# Patient Record
Sex: MALE | Race: WHITE | NOT HISPANIC OR LATINO | URBAN - METROPOLITAN AREA
[De-identification: names, ages, dates, MRNs, and addresses within clinical notes are randomized per-mention and may not be internally consistent; named-entity substitution may affect disease eponyms.]

---

## 2017-12-31 ENCOUNTER — INPATIENT (INPATIENT)
Facility: HOSPITAL | Age: 67
LOS: 0 days | Discharge: HOME | DRG: 694 | End: 2018-01-01
Attending: UROLOGY | Admitting: UROLOGY
Payer: COMMERCIAL

## 2017-12-31 VITALS
WEIGHT: 194.01 LBS | SYSTOLIC BLOOD PRESSURE: 195 MMHG | HEART RATE: 84 BPM | TEMPERATURE: 98 F | DIASTOLIC BLOOD PRESSURE: 94 MMHG | RESPIRATION RATE: 20 BRPM | OXYGEN SATURATION: 97 %

## 2017-12-31 DIAGNOSIS — N13.2 HYDRONEPHROSIS WITH RENAL AND URETERAL CALCULOUS OBSTRUCTION: ICD-10-CM

## 2017-12-31 DIAGNOSIS — Z98.890 OTHER SPECIFIED POSTPROCEDURAL STATES: Chronic | ICD-10-CM

## 2017-12-31 LAB — GLUCOSE BLDC GLUCOMTR-MCNC: 227 MG/DL — HIGH (ref 70–99)

## 2017-12-31 PROCEDURE — 93010 ELECTROCARDIOGRAM REPORT: CPT

## 2017-12-31 PROCEDURE — 99285 EMERGENCY DEPT VISIT HI MDM: CPT | Mod: 25

## 2017-12-31 PROCEDURE — 74176 CT ABD & PELVIS W/O CONTRAST: CPT | Mod: 26

## 2017-12-31 PROCEDURE — 71010: CPT | Mod: 26

## 2017-12-31 RX ORDER — MORPHINE SULFATE 50 MG/1
4 CAPSULE, EXTENDED RELEASE ORAL ONCE
Qty: 0 | Refills: 0 | Status: DISCONTINUED | OUTPATIENT
Start: 2017-12-31 | End: 2017-12-31

## 2017-12-31 RX ORDER — DEXTROSE 50 % IN WATER 50 %
25 SYRINGE (ML) INTRAVENOUS ONCE
Qty: 0 | Refills: 0 | Status: DISCONTINUED | OUTPATIENT
Start: 2017-12-31 | End: 2018-01-01

## 2017-12-31 RX ORDER — RIVAROXABAN 15 MG-20MG
20 KIT ORAL
Qty: 0 | Refills: 0 | Status: DISCONTINUED | OUTPATIENT
Start: 2018-01-01 | End: 2018-01-01

## 2017-12-31 RX ORDER — SODIUM CHLORIDE 9 MG/ML
1000 INJECTION INTRAMUSCULAR; INTRAVENOUS; SUBCUTANEOUS
Qty: 0 | Refills: 0 | Status: DISCONTINUED | OUTPATIENT
Start: 2017-12-31 | End: 2018-01-01

## 2017-12-31 RX ORDER — ACETAMINOPHEN 500 MG
650 TABLET ORAL EVERY 6 HOURS
Qty: 0 | Refills: 0 | Status: DISCONTINUED | OUTPATIENT
Start: 2017-12-31 | End: 2018-01-01

## 2017-12-31 RX ORDER — SODIUM CHLORIDE 9 MG/ML
1000 INJECTION INTRAMUSCULAR; INTRAVENOUS; SUBCUTANEOUS ONCE
Qty: 0 | Refills: 0 | Status: COMPLETED | OUTPATIENT
Start: 2017-12-31 | End: 2017-12-31

## 2017-12-31 RX ORDER — OXYCODONE AND ACETAMINOPHEN 5; 325 MG/1; MG/1
1 TABLET ORAL EVERY 4 HOURS
Qty: 0 | Refills: 0 | Status: DISCONTINUED | OUTPATIENT
Start: 2017-12-31 | End: 2018-01-01

## 2017-12-31 RX ORDER — SODIUM CHLORIDE 9 MG/ML
1000 INJECTION, SOLUTION INTRAVENOUS
Qty: 0 | Refills: 0 | Status: DISCONTINUED | OUTPATIENT
Start: 2017-12-31 | End: 2018-01-01

## 2017-12-31 RX ORDER — INSULIN LISPRO 100/ML
VIAL (ML) SUBCUTANEOUS
Qty: 0 | Refills: 0 | Status: DISCONTINUED | OUTPATIENT
Start: 2017-12-31 | End: 2018-01-01

## 2017-12-31 RX ORDER — DEXTROSE 50 % IN WATER 50 %
1 SYRINGE (ML) INTRAVENOUS ONCE
Qty: 0 | Refills: 0 | Status: DISCONTINUED | OUTPATIENT
Start: 2017-12-31 | End: 2018-01-01

## 2017-12-31 RX ORDER — GLUCAGON INJECTION, SOLUTION 0.5 MG/.1ML
1 INJECTION, SOLUTION SUBCUTANEOUS ONCE
Qty: 0 | Refills: 0 | Status: DISCONTINUED | OUTPATIENT
Start: 2017-12-31 | End: 2018-01-01

## 2017-12-31 RX ORDER — ONDANSETRON 8 MG/1
4 TABLET, FILM COATED ORAL ONCE
Qty: 0 | Refills: 0 | Status: COMPLETED | OUTPATIENT
Start: 2017-12-31 | End: 2017-12-31

## 2017-12-31 RX ORDER — HYDROMORPHONE HYDROCHLORIDE 2 MG/ML
0.5 INJECTION INTRAMUSCULAR; INTRAVENOUS; SUBCUTANEOUS EVERY 4 HOURS
Qty: 0 | Refills: 0 | Status: DISCONTINUED | OUTPATIENT
Start: 2017-12-31 | End: 2018-01-01

## 2017-12-31 RX ORDER — ONDANSETRON 8 MG/1
4 TABLET, FILM COATED ORAL EVERY 8 HOURS
Qty: 0 | Refills: 0 | Status: DISCONTINUED | OUTPATIENT
Start: 2017-12-31 | End: 2018-01-01

## 2017-12-31 RX ORDER — INSULIN GLARGINE 100 [IU]/ML
24 INJECTION, SOLUTION SUBCUTANEOUS AT BEDTIME
Qty: 0 | Refills: 0 | Status: DISCONTINUED | OUTPATIENT
Start: 2017-12-31 | End: 2018-01-01

## 2017-12-31 RX ORDER — OXYCODONE AND ACETAMINOPHEN 5; 325 MG/1; MG/1
2 TABLET ORAL EVERY 4 HOURS
Qty: 0 | Refills: 0 | Status: DISCONTINUED | OUTPATIENT
Start: 2017-12-31 | End: 2018-01-01

## 2017-12-31 RX ORDER — DEXTROSE 50 % IN WATER 50 %
12.5 SYRINGE (ML) INTRAVENOUS ONCE
Qty: 0 | Refills: 0 | Status: DISCONTINUED | OUTPATIENT
Start: 2017-12-31 | End: 2018-01-01

## 2017-12-31 RX ADMIN — MORPHINE SULFATE 4 MILLIGRAM(S): 50 CAPSULE, EXTENDED RELEASE ORAL at 17:56

## 2017-12-31 RX ADMIN — SODIUM CHLORIDE 1000 MILLILITER(S): 9 INJECTION INTRAMUSCULAR; INTRAVENOUS; SUBCUTANEOUS at 17:56

## 2017-12-31 RX ADMIN — MORPHINE SULFATE 4 MILLIGRAM(S): 50 CAPSULE, EXTENDED RELEASE ORAL at 20:30

## 2017-12-31 RX ADMIN — INSULIN GLARGINE 24 UNIT(S): 100 INJECTION, SOLUTION SUBCUTANEOUS at 23:11

## 2017-12-31 RX ADMIN — SODIUM CHLORIDE 1000 MILLILITER(S): 9 INJECTION INTRAMUSCULAR; INTRAVENOUS; SUBCUTANEOUS at 20:13

## 2017-12-31 RX ADMIN — ONDANSETRON 4 MILLIGRAM(S): 8 TABLET, FILM COATED ORAL at 17:56

## 2017-12-31 RX ADMIN — MORPHINE SULFATE 4 MILLIGRAM(S): 50 CAPSULE, EXTENDED RELEASE ORAL at 20:12

## 2017-12-31 RX ADMIN — MORPHINE SULFATE 4 MILLIGRAM(S): 50 CAPSULE, EXTENDED RELEASE ORAL at 18:45

## 2017-12-31 RX ADMIN — Medication 4: at 23:11

## 2017-12-31 RX ADMIN — MORPHINE SULFATE 4 MILLIGRAM(S): 50 CAPSULE, EXTENDED RELEASE ORAL at 20:14

## 2017-12-31 NOTE — ED PROVIDER NOTE - MEDICAL DECISION MAKING DETAILS
66 y/o m hx HTN, DM, DVT/PE on xarelto, kidney stones presents with sudden onset left flank/lower abd pain; pt vomited once in ED, normal creatinine, blood in urine.  Likely recurrent stone, will get CT, given morphine/zofran, IV hydration.  F/u CT and reassess.

## 2017-12-31 NOTE — ED PROVIDER NOTE - OBJECTIVE STATEMENT
66 y/o m hx HTN, DVT/PE on xarelto, DM, kidney stones presents c/o sudden onset left flank pain today with nausea.  Pt stating pain similar to previous episodes of kidney stones, although hasn't had in about 10 years.  Pt denies CP, SOB, dysuria, fever, chills, all other ROS negative.

## 2017-12-31 NOTE — H&P ADULT - NSHPPHYSICALEXAM_GEN_ALL_CORE
Gen: alert and awake  Abd: obese, soft, tender L mid abdomen and L flank  : +L CVAT, testes bilat descended, circumcised male, without masses  Ext: LLE>RLE, no calf tenderness bilaterally, +varicosites bilat ankles

## 2017-12-31 NOTE — H&P ADULT - HISTORY OF PRESENT ILLNESS
66 yo male with h/o kidney stones (s/p multiple lithotripsies), s/p R partial nephrectomy 2008, DM, htn, LBBB, tachycardia (s/p ablation 2012, and DVT LLE (on xarelto), presents to ED tonight c/o severe L flank pain with nausea since early afternoon today. No vomiting. No fever/chills. Voiding OK. No dysuria/hematuria.   CT a/p c/w 7mm L proximal ureteral stone with moderate hydronephrosis. 68 yo male with h/o kidney stones (s/p multiple lithotripsies), s/p R partial nephrectomy 2008, DM, htn, RBBB, tachycardia (s/p ablation 2012, and DVT LLE (on xarelto), presents to ED tonight c/o severe L flank pain with nausea since early afternoon today. No vomiting. No fever/chills. Voiding OK. No dysuria/hematuria.   CT a/p c/w 7mm L proximal ureteral stone with moderate hydronephrosis. 66 yo male with h/o kidney stones (s/p multiple lithotripsies), R renal Ca- s/p R partial nephrectomy 2008, DM, htn, RBBB, tachycardia (s/p ablation 2012), and DVT LLE (on xarelto), presents to ED tonight c/o severe L flank pain with nausea since early afternoon today. No vomiting. No fever/chills. Voiding OK. No dysuria/hematuria.   CT a/p c/w 7mm L proximal ureteral stone with moderate hydronephrosis.

## 2017-12-31 NOTE — ED PROVIDER NOTE - ATTENDING CONTRIBUTION TO CARE
Pt is a 66yo m, h/o htn, dm, dvt/pe on xarelto, kidney stones, who p/w c/o left flank pain radiating to left abd, with associated nausea, dry heaving. No dysuria, hematuria, freq, fever, chills. No cp, sob. Afebrile. HDS. WNWD m in mild painful distress. + s1, s2, rrr. Lungs cta b/l. Abd soft, mild left sided abd tenderness, no guarding/ rebound. + left cvat. Labs reviewed w/ findings of mild leukocytosis to 13, gluc to 200's, normal renal function, inr therapeutic range. UA + for blood, no infection. Pt arranged for non-contrast ct a/p to r/o kidney stone. Pt given morphine, zofran, ivf with improvement of sx's. Will continue to monitor.

## 2017-12-31 NOTE — ED ADULT TRIAGE NOTE - CHIEF COMPLAINT QUOTE
patient BIBA complains of left sided abdominal pain radiating to the left flank and testicle x 2 hours with nausea. patient reports history of kidney stones. denies fever chills.

## 2017-12-31 NOTE — H&P ADULT - NSHPLABSRESULTS_GEN_ALL_CORE
14.7   13.7  )-----------( 271      ( 31 Dec 2017 17:39 )             45.1       139  |  99  |  18  ----------------------------<  215<H>  4.3   |  21<L>  |  1.06    Ca    9.5      31 Dec 2017 17:38    TPro  7.5  /  Alb  4.0  /  TBili  0.3  /  DBili  x   /  AST  19  /  ALT  24  /  AlkPhos  120    PT/INR - ( 31 Dec 2017 17:39 )   PT: 27.3 sec;   INR: 2.42          PTT - ( 31 Dec 2017 17:39 )  PTT:56.3 sec  Urinalysis Basic - ( 31 Dec 2017 17:27 )    Color: Yellow / Appearance: Clear / S.025 / pH: x  Gluc: x / Ketone: Trace mg/dL  / Bili: Negative / Urobili: 0.2 E.U./dL   Blood: x / Protein: NEGATIVE mg/dL / Nitrite: NEGATIVE   Leuk Esterase: NEGATIVE / RBC: > 10 /HPF / WBC < 5 /HPF   Sq Epi: x / Non Sq Epi: 0-5 /HPF / Bacteria: Present /HPF    < from: CT Abdomen and Pelvis No Cont (17 @ 18:37) >      IMPRESSION:   Mildleft-sided hydronephrosis and hydroureter secondary to an   obstructing calculus in the proximal ureter measuring 7 mm. Additional   nonobstructing 5 mm left renal calculus. Mild perinephric stranding on   the left. No evidence of hydronephrosis or nephrolithiasis on the right.   Ancillary findings as above.         < end of copied text >

## 2017-12-31 NOTE — H&P ADULT - PROBLEM SELECTOR PLAN 1
-admit  -IVF's  -NPO P MN  -add on for OR 1/1/18 for cysto, L ureteral stent  -pain control  -continue xarelto  -cardiology for clearance  -insulin sliding scale  -f/u EKG and CXR

## 2017-12-31 NOTE — H&P ADULT - PMH
DM (diabetes mellitus)    DVT of lower extremity (deep venous thrombosis)  LLE OCT/2017  HTN (hypertension)    LBBB (left bundle branch block)    Renal cancer, right  s/p R partial nephrectomy 2008  Renal stones    Tachycardia, unspecified  s/p ablation 2012 DM (diabetes mellitus)    DVT of lower extremity (deep venous thrombosis)  LLE OCT/2017  HTN (hypertension)    RBBB    Renal cancer, right  s/p R partial nephrectomy 2008  Renal stones    Tachycardia, unspecified  s/p ablation 2012

## 2018-01-01 ENCOUNTER — TRANSCRIPTION ENCOUNTER (OUTPATIENT)
Age: 68
End: 2018-01-01

## 2018-01-01 VITALS
OXYGEN SATURATION: 99 % | RESPIRATION RATE: 17 BRPM | SYSTOLIC BLOOD PRESSURE: 131 MMHG | HEART RATE: 87 BPM | TEMPERATURE: 100 F | DIASTOLIC BLOOD PRESSURE: 80 MMHG

## 2018-01-01 LAB
ANION GAP SERPL CALC-SCNC: 12 MMOL/L — SIGNIFICANT CHANGE UP (ref 5–17)
APTT BLD: 33.5 SEC — SIGNIFICANT CHANGE UP (ref 27.5–37.4)
BLD GP AB SCN SERPL QL: NEGATIVE — SIGNIFICANT CHANGE UP
BUN SERPL-MCNC: 18 MG/DL — SIGNIFICANT CHANGE UP (ref 7–23)
CALCIUM SERPL-MCNC: 8.3 MG/DL — LOW (ref 8.4–10.5)
CHLORIDE SERPL-SCNC: 104 MMOL/L — SIGNIFICANT CHANGE UP (ref 96–108)
CO2 SERPL-SCNC: 23 MMOL/L — SIGNIFICANT CHANGE UP (ref 22–31)
CREAT SERPL-MCNC: 1.29 MG/DL — SIGNIFICANT CHANGE UP (ref 0.5–1.3)
GLUCOSE BLDC GLUCOMTR-MCNC: 151 MG/DL — HIGH (ref 70–99)
GLUCOSE BLDC GLUCOMTR-MCNC: 182 MG/DL — HIGH (ref 70–99)
GLUCOSE SERPL-MCNC: 220 MG/DL — HIGH (ref 70–99)
HCT VFR BLD CALC: 43.2 % — SIGNIFICANT CHANGE UP (ref 39–50)
HGB BLD-MCNC: 13.8 G/DL — SIGNIFICANT CHANGE UP (ref 13–17)
INR BLD: 1.42 — HIGH (ref 0.88–1.16)
MAGNESIUM SERPL-MCNC: 1.8 MG/DL — SIGNIFICANT CHANGE UP (ref 1.6–2.6)
MCHC RBC-ENTMCNC: 29.6 PG — SIGNIFICANT CHANGE UP (ref 27–34)
MCHC RBC-ENTMCNC: 31.9 G/DL — LOW (ref 32–36)
MCV RBC AUTO: 92.7 FL — SIGNIFICANT CHANGE UP (ref 80–100)
PHOSPHATE SERPL-MCNC: 3 MG/DL — SIGNIFICANT CHANGE UP (ref 2.5–4.5)
PLATELET # BLD AUTO: 271 K/UL — SIGNIFICANT CHANGE UP (ref 150–400)
POTASSIUM SERPL-MCNC: 4.8 MMOL/L — SIGNIFICANT CHANGE UP (ref 3.5–5.3)
POTASSIUM SERPL-SCNC: 4.8 MMOL/L — SIGNIFICANT CHANGE UP (ref 3.5–5.3)
PROTHROM AB SERPL-ACNC: 15.9 SEC — HIGH (ref 9.8–12.7)
RBC # BLD: 4.66 M/UL — SIGNIFICANT CHANGE UP (ref 4.2–5.8)
RBC # FLD: 14 % — SIGNIFICANT CHANGE UP (ref 10.3–16.9)
RH IG SCN BLD-IMP: POSITIVE — SIGNIFICANT CHANGE UP
SODIUM SERPL-SCNC: 139 MMOL/L — SIGNIFICANT CHANGE UP (ref 135–145)
WBC # BLD: 15.3 K/UL — HIGH (ref 3.8–10.5)
WBC # FLD AUTO: 15.3 K/UL — HIGH (ref 3.8–10.5)

## 2018-01-01 PROCEDURE — 96374 THER/PROPH/DIAG INJ IV PUSH: CPT

## 2018-01-01 PROCEDURE — 71045 X-RAY EXAM CHEST 1 VIEW: CPT

## 2018-01-01 PROCEDURE — 87086 URINE CULTURE/COLONY COUNT: CPT

## 2018-01-01 PROCEDURE — 36415 COLL VENOUS BLD VENIPUNCTURE: CPT

## 2018-01-01 PROCEDURE — 86901 BLOOD TYPING SEROLOGIC RH(D): CPT

## 2018-01-01 PROCEDURE — 85730 THROMBOPLASTIN TIME PARTIAL: CPT

## 2018-01-01 PROCEDURE — 96376 TX/PRO/DX INJ SAME DRUG ADON: CPT

## 2018-01-01 PROCEDURE — 86900 BLOOD TYPING SEROLOGIC ABO: CPT

## 2018-01-01 PROCEDURE — 83690 ASSAY OF LIPASE: CPT

## 2018-01-01 PROCEDURE — 85610 PROTHROMBIN TIME: CPT

## 2018-01-01 PROCEDURE — 81001 URINALYSIS AUTO W/SCOPE: CPT

## 2018-01-01 PROCEDURE — 74176 CT ABD & PELVIS W/O CONTRAST: CPT

## 2018-01-01 PROCEDURE — 80048 BASIC METABOLIC PNL TOTAL CA: CPT

## 2018-01-01 PROCEDURE — 83735 ASSAY OF MAGNESIUM: CPT

## 2018-01-01 PROCEDURE — 85027 COMPLETE CBC AUTOMATED: CPT

## 2018-01-01 PROCEDURE — 84100 ASSAY OF PHOSPHORUS: CPT

## 2018-01-01 PROCEDURE — 99285 EMERGENCY DEPT VISIT HI MDM: CPT | Mod: 25

## 2018-01-01 PROCEDURE — 80053 COMPREHEN METABOLIC PANEL: CPT

## 2018-01-01 PROCEDURE — 93005 ELECTROCARDIOGRAM TRACING: CPT

## 2018-01-01 PROCEDURE — 85025 COMPLETE CBC W/AUTO DIFF WBC: CPT

## 2018-01-01 PROCEDURE — 96375 TX/PRO/DX INJ NEW DRUG ADDON: CPT

## 2018-01-01 PROCEDURE — 86850 RBC ANTIBODY SCREEN: CPT

## 2018-01-01 PROCEDURE — 82962 GLUCOSE BLOOD TEST: CPT

## 2018-01-01 RX ORDER — TAMSULOSIN HYDROCHLORIDE 0.4 MG/1
1 CAPSULE ORAL
Qty: 21 | Refills: 0 | OUTPATIENT
Start: 2018-01-01 | End: 2018-01-21

## 2018-01-01 RX ORDER — DOCUSATE SODIUM 100 MG
1 CAPSULE ORAL
Qty: 12 | Refills: 0 | OUTPATIENT
Start: 2018-01-01 | End: 2018-01-06

## 2018-01-01 RX ORDER — TAMSULOSIN HYDROCHLORIDE 0.4 MG/1
0.4 CAPSULE ORAL AT BEDTIME
Qty: 0 | Refills: 0 | Status: DISCONTINUED | OUTPATIENT
Start: 2018-01-01 | End: 2018-01-01

## 2018-01-01 RX ADMIN — SODIUM CHLORIDE 100 MILLILITER(S): 9 INJECTION INTRAMUSCULAR; INTRAVENOUS; SUBCUTANEOUS at 07:58

## 2018-01-01 RX ADMIN — Medication 2: at 07:58

## 2018-01-01 RX ADMIN — OXYCODONE AND ACETAMINOPHEN 2 TABLET(S): 5; 325 TABLET ORAL at 14:30

## 2018-01-01 RX ADMIN — RIVAROXABAN 20 MILLIGRAM(S): KIT at 13:09

## 2018-01-01 RX ADMIN — OXYCODONE AND ACETAMINOPHEN 2 TABLET(S): 5; 325 TABLET ORAL at 13:10

## 2018-01-01 RX ADMIN — TAMSULOSIN HYDROCHLORIDE 0.4 MILLIGRAM(S): 0.4 CAPSULE ORAL at 07:58

## 2018-01-01 NOTE — DISCHARGE NOTE ADULT - HOSPITAL COURSE
67M hx DM, kidney stones, HTN, DVT LLE on xarelto, LBBB right renal cancer s/p R partial nephrectomy 2008 came to [facility] with a 7mm proximal left ureteral stone with moderate hydronephrosis. Patient managed conservatively with IVF and pain medication. Patient status improved. AVSS, afebrile and hemodynamically stable

## 2018-01-01 NOTE — DISCHARGE NOTE ADULT - PLAN OF CARE
ambulation, hydration regular diet, activity as tolerated. No heavy lifting. Stay well hydrated. If fever >100.4 or any change or worsening of symptoms please call doctor or report to ED. Make follow up appointment with Dr Eagle for stone management. Please also make a followup appointment with Dr Aragon (internist)

## 2018-01-01 NOTE — DISCHARGE NOTE ADULT - CARE PROVIDER_API CALL
Jomar Eagle), Urology  [address] [place] [address]  Phone: [phone]  Fax: [phone]    Petra Aragon), Critical Care Medicine; Internal Medicine  [address]  [place] [address]  Phone: [phone]  Fax: [phone]

## 2018-01-01 NOTE — PROGRESS NOTE ADULT - SUBJECTIVE AND OBJECTIVE BOX
INTERVAL HPI/OVERNIGHT EVENTS:  No acute events overnight.    VITALS:    T(F): 98.2 (18 @ 05:29), Max: 98.7 (17 @ 19:06)  HR: 76 (18 @ 05:29) (76 - 101)  BP: 148/85 (18 @ 05:29) (132/66 - 195/94)  RR: 17 (18 @ 05:29) (17 - 20)  SpO2: 97% (18 @ 05:29) (93% - 100%)  Wt(kg): --    I&O's Detail    31 Dec 2017 07:01  -  2018 05:46  --------------------------------------------------------  IN:    sodium chloride 0.9%.: 900 mL  Total IN: 900 mL    OUT:  Total OUT: 0 mL    Total NET: 900 mL          MEDICATIONS:    ANTIBIOTICS:      PAIN CONTROL:  acetaminophen   Tablet. 650 milliGRAM(s) Oral every 6 hours PRN  HYDROmorphone  Injectable 0.5 milliGRAM(s) IV Push every 4 hours PRN  ondansetron Injectable 4 milliGRAM(s) IV Push every 8 hours PRN  oxyCODONE    5 mG/acetaminophen 325 mG 1 Tablet(s) Oral every 4 hours PRN  oxyCODONE    5 mG/acetaminophen 325 mG 2 Tablet(s) Oral every 4 hours PRN       MEDS:      HEME/ONC  rivaroxaban 20 milliGRAM(s) Oral with breakfast        PHYSICAL EXAM:  General: No acute distress.  Alert and Oriented  Abdominal Exam:   Exam:      LABS:                        14.7   13.7  )-----------( 271      ( 31 Dec 2017 17:39 )             45.1     -    139  |  99  |  18  ----------------------------<  215<H>  4.3   |  21<L>  |  1.06    Ca    9.5      31 Dec 2017 17:38    TPro  7.5  /  Alb  4.0  /  TBili  0.3  /  DBili  x   /  AST  19  /  ALT  24  /  AlkPhos  120  12-31    PT/INR - ( 31 Dec 2017 17:39 )   PT: 27.3 sec;   INR: 2.42          PTT - ( 31 Dec 2017 17:39 )  PTT:56.3 sec  Urinalysis Basic - ( 31 Dec 2017 17:27 )    Color: Yellow / Appearance: Clear / S.025 / pH: x  Gluc: x / Ketone: Trace mg/dL  / Bili: Negative / Urobili: 0.2 E.U./dL   Blood: x / Protein: NEGATIVE mg/dL / Nitrite: NEGATIVE   Leuk Esterase: NEGATIVE / RBC: > 10 /HPF / WBC < 5 /HPF   Sq Epi: x / Non Sq Epi: 0-5 /HPF / Bacteria: Present /HPF        RADIOLOGY & ADDITIONAL TESTS: INTERVAL HPI/OVERNIGHT EVENTS:  Less discomfort L flank/back. No nausea/vomiting. Did not require pain medication after coming to his room.     VITALS:    T(F): 98.2 (18 @ 05:29), Max: 98.7 (17 @ 19:06)  HR: 76 (18 @ 05:29) (76 - 101)  BP: 148/85 (18 @ 05:29) (132/66 - 195/94)  RR: 17 (18 @ 05:29) (17 - 20)  SpO2: 97% (18 @ 05:29) (93% - 100%)  Wt(kg): --    I&O's Detail    31 Dec 2017 07:01  -  2018 05:46  --------------------------------------------------------  IN:    sodium chloride 0.9%.: 900 mL  Total IN: 900 mL    OUT:  Total OUT: 0 mL    Total NET: 900 mL          MEDICATIONS:    ANTIBIOTICS:      PAIN CONTROL:  acetaminophen   Tablet. 650 milliGRAM(s) Oral every 6 hours PRN  HYDROmorphone  Injectable 0.5 milliGRAM(s) IV Push every 4 hours PRN  ondansetron Injectable 4 milliGRAM(s) IV Push every 8 hours PRN  oxyCODONE    5 mG/acetaminophen 325 mG 1 Tablet(s) Oral every 4 hours PRN  oxyCODONE    5 mG/acetaminophen 325 mG 2 Tablet(s) Oral every 4 hours PRN       MEDS:      HEME/ONC  rivaroxaban 20 milliGRAM(s) Oral with breakfast        PHYSICAL EXAM:  General: No acute distress.  Alert and Oriented  Abdominal Exam: obese, soft, mild L flank discomfort   Exam: mild L CVAT      LABS:                        14.7   13.7  )-----------( 271      ( 31 Dec 2017 17:39 )             45.1         139  |  99  |  18  ----------------------------<  215<H>  4.3   |  21<L>  |  1.06    Ca    9.5      31 Dec 2017 17:38    TPro  7.5  /  Alb  4.0  /  TBili  0.3  /  DBili  x   /  AST  19  /  ALT  24  /  AlkPhos  120  12-31    PT/INR - ( 31 Dec 2017 17:39 )   PT: 27.3 sec;   INR: 2.42          PTT - ( 31 Dec 2017 17:39 )  PTT:56.3 sec  Urinalysis Basic - ( 31 Dec 2017 17:27 )    Color: Yellow / Appearance: Clear / S.025 / pH: x  Gluc: x / Ketone: Trace mg/dL  / Bili: Negative / Urobili: 0.2 E.U./dL   Blood: x / Protein: NEGATIVE mg/dL / Nitrite: NEGATIVE   Leuk Esterase: NEGATIVE / RBC: > 10 /HPF / WBC < 5 /HPF   Sq Epi: x / Non Sq Epi: 0-5 /HPF / Bacteria: Present /HPF        RADIOLOGY & ADDITIONAL TESTS:

## 2018-01-01 NOTE — PROGRESS NOTE ADULT - PROBLEM SELECTOR PLAN 1
-stable  -OOB  -f/u labs  -f/u cardiology clearance  -NPO for possible OR today -stable  -OOB  -f/u labs  -f/u cardiology clearance  -NPO for possible OR today  -continue present care

## 2018-01-01 NOTE — DISCHARGE NOTE ADULT - PATIENT PORTAL LINK FT
“You can access the Follow[employer] Patient Portal, offered by [facility], by registering with the following website: [email or web address]”

## 2018-01-01 NOTE — DISCHARGE NOTE ADULT - CARE PROVIDERS DIRECT ADDRESSES
[email or web address]

## 2018-01-01 NOTE — DISCHARGE NOTE ADULT - MEDICATION SUMMARY - MEDICATIONS TO TAKE
I will START or STAY ON the medications listed below when I get home from the hospital:    Percocet 5/325 oral tablet  -- 1 tab(s) by mouth every 6 hours, As Needed -for severe pain MDD:4   -- Caution federal law prohibits the transfer of this drug to any person other  than the person for whom it was prescribed.  May cause drowsiness.  Alcohol may intensify this effect.  Use care when operating dangerous machinery.  This prescription cannot be refilled.  This product contains acetaminophen.  Do not use  with any other product containing acetaminophen to prevent possible liver damage.  Using more of this medication than prescribed may cause serious breathing problems.    -- Indication: For For pain    Flomax 0.4 mg oral capsule  -- 1 cap(s) by mouth once a day   -- It is very important that you take or use this exactly as directed.  Do not skip doses or discontinue unless directed by your doctor.  May cause drowsiness.  Alcohol may intensify this effect.  Use care when operating dangerous machinery.  Some non-prescription drugs may aggravate your condition.  Read all labels carefully.  If a warning appears, check with your doctor before taking.  Swallow whole.  Do not crush.  Take with food or milk.    -- Indication: For Urine flow    Xarelto 20 mg oral tablet  -- 1 tab(s) by mouth once a day (in the morning)  -- Indication: For Home med    metFORMIN  -- Indication: For Home med    Levemir  -- 48 unit(s) subcutaneous once a day (at bedtime)  -- Indication: For Home med    metoprolol  -- orally once a day  -- Indication: For Home med    Colace 100 mg oral capsule  -- 1 cap(s) by mouth 2 times a day, As Needed -for constipation   -- Medication should be taken with plenty of water.    -- Indication: For For constipation

## 2018-01-02 ENCOUNTER — INPATIENT (INPATIENT)
Facility: HOSPITAL | Age: 68
LOS: 2 days | Discharge: HOME | DRG: 694 | End: 2018-01-05
Attending: UROLOGY | Admitting: UROLOGY
Payer: MEDICARE

## 2018-01-02 VITALS
TEMPERATURE: 99 F | HEART RATE: 102 BPM | OXYGEN SATURATION: 99 % | RESPIRATION RATE: 18 BRPM | DIASTOLIC BLOOD PRESSURE: 105 MMHG | SYSTOLIC BLOOD PRESSURE: 176 MMHG

## 2018-01-02 DIAGNOSIS — Z98.890 OTHER SPECIFIED POSTPROCEDURAL STATES: Chronic | ICD-10-CM

## 2018-01-02 DIAGNOSIS — N20.0 CALCULUS OF KIDNEY: ICD-10-CM

## 2018-01-02 LAB
ANION GAP SERPL CALC-SCNC: 16 MMOL/L — SIGNIFICANT CHANGE UP (ref 5–17)
APTT BLD: 36.4 SEC — SIGNIFICANT CHANGE UP (ref 27.5–37.4)
BASOPHILS NFR BLD AUTO: 0.2 % — SIGNIFICANT CHANGE UP (ref 0–2)
BUN SERPL-MCNC: 21 MG/DL — SIGNIFICANT CHANGE UP (ref 7–23)
CALCIUM SERPL-MCNC: 9.1 MG/DL — SIGNIFICANT CHANGE UP (ref 8.4–10.5)
CHLORIDE SERPL-SCNC: 100 MMOL/L — SIGNIFICANT CHANGE UP (ref 96–108)
CO2 SERPL-SCNC: 23 MMOL/L — SIGNIFICANT CHANGE UP (ref 22–31)
CREAT SERPL-MCNC: 1.83 MG/DL — HIGH (ref 0.5–1.3)
EOSINOPHIL NFR BLD AUTO: 0.2 % — SIGNIFICANT CHANGE UP (ref 0–6)
GLUCOSE SERPL-MCNC: 166 MG/DL — HIGH (ref 70–99)
HCT VFR BLD CALC: 46.1 % — SIGNIFICANT CHANGE UP (ref 39–50)
HGB BLD-MCNC: 14.6 G/DL — SIGNIFICANT CHANGE UP (ref 13–17)
INR BLD: 2.49 — HIGH (ref 0.88–1.16)
LYMPHOCYTES # BLD AUTO: 9.9 % — LOW (ref 13–44)
MCHC RBC-ENTMCNC: 29.5 PG — SIGNIFICANT CHANGE UP (ref 27–34)
MCHC RBC-ENTMCNC: 31.7 G/DL — LOW (ref 32–36)
MCV RBC AUTO: 93.1 FL — SIGNIFICANT CHANGE UP (ref 80–100)
MONOCYTES NFR BLD AUTO: 9.9 % — SIGNIFICANT CHANGE UP (ref 2–14)
NEUTROPHILS NFR BLD AUTO: 79.8 % — HIGH (ref 43–77)
PLATELET # BLD AUTO: 254 K/UL — SIGNIFICANT CHANGE UP (ref 150–400)
POTASSIUM SERPL-MCNC: 4.2 MMOL/L — SIGNIFICANT CHANGE UP (ref 3.5–5.3)
POTASSIUM SERPL-SCNC: 4.2 MMOL/L — SIGNIFICANT CHANGE UP (ref 3.5–5.3)
PROTHROM AB SERPL-ACNC: 28.2 SEC — HIGH (ref 9.8–12.7)
RBC # BLD: 4.95 M/UL — SIGNIFICANT CHANGE UP (ref 4.2–5.8)
RBC # FLD: 14.3 % — SIGNIFICANT CHANGE UP (ref 10.3–16.9)
SODIUM SERPL-SCNC: 139 MMOL/L — SIGNIFICANT CHANGE UP (ref 135–145)
WBC # BLD: 15.8 K/UL — HIGH (ref 3.8–10.5)
WBC # FLD AUTO: 15.8 K/UL — HIGH (ref 3.8–10.5)

## 2018-01-02 PROCEDURE — 99285 EMERGENCY DEPT VISIT HI MDM: CPT

## 2018-01-02 RX ORDER — ACETAMINOPHEN 500 MG
650 TABLET ORAL EVERY 6 HOURS
Qty: 0 | Refills: 0 | Status: DISCONTINUED | OUTPATIENT
Start: 2018-01-02 | End: 2018-01-03

## 2018-01-02 RX ORDER — SODIUM CHLORIDE 9 MG/ML
1000 INJECTION, SOLUTION INTRAVENOUS
Qty: 0 | Refills: 0 | Status: DISCONTINUED | OUTPATIENT
Start: 2018-01-02 | End: 2018-01-03

## 2018-01-02 RX ORDER — ONDANSETRON 8 MG/1
4 TABLET, FILM COATED ORAL ONCE
Qty: 0 | Refills: 0 | Status: COMPLETED | OUTPATIENT
Start: 2018-01-02 | End: 2018-01-02

## 2018-01-02 RX ORDER — CEFTRIAXONE 500 MG/1
1 INJECTION, POWDER, FOR SOLUTION INTRAMUSCULAR; INTRAVENOUS ONCE
Qty: 0 | Refills: 0 | Status: COMPLETED | OUTPATIENT
Start: 2018-01-02 | End: 2018-01-02

## 2018-01-02 RX ORDER — GLUCAGON INJECTION, SOLUTION 0.5 MG/.1ML
1 INJECTION, SOLUTION SUBCUTANEOUS ONCE
Qty: 0 | Refills: 0 | Status: DISCONTINUED | OUTPATIENT
Start: 2018-01-02 | End: 2018-01-03

## 2018-01-02 RX ORDER — SODIUM CHLORIDE 9 MG/ML
1000 INJECTION INTRAMUSCULAR; INTRAVENOUS; SUBCUTANEOUS ONCE
Qty: 0 | Refills: 0 | Status: COMPLETED | OUTPATIENT
Start: 2018-01-02 | End: 2018-01-02

## 2018-01-02 RX ORDER — DEXTROSE 50 % IN WATER 50 %
25 SYRINGE (ML) INTRAVENOUS ONCE
Qty: 0 | Refills: 0 | Status: DISCONTINUED | OUTPATIENT
Start: 2018-01-02 | End: 2018-01-03

## 2018-01-02 RX ORDER — TAMSULOSIN HYDROCHLORIDE 0.4 MG/1
0.4 CAPSULE ORAL DAILY
Qty: 0 | Refills: 0 | Status: DISCONTINUED | OUTPATIENT
Start: 2018-01-02 | End: 2018-01-03

## 2018-01-02 RX ORDER — ACETAMINOPHEN 500 MG
975 TABLET ORAL ONCE
Qty: 0 | Refills: 0 | Status: COMPLETED | OUTPATIENT
Start: 2018-01-02 | End: 2018-01-02

## 2018-01-02 RX ORDER — INSULIN LISPRO 100/ML
VIAL (ML) SUBCUTANEOUS
Qty: 0 | Refills: 0 | Status: DISCONTINUED | OUTPATIENT
Start: 2018-01-02 | End: 2018-01-03

## 2018-01-02 RX ORDER — DOCUSATE SODIUM 100 MG
100 CAPSULE ORAL
Qty: 0 | Refills: 0 | Status: DISCONTINUED | OUTPATIENT
Start: 2018-01-02 | End: 2018-01-03

## 2018-01-02 RX ORDER — HYDROMORPHONE HYDROCHLORIDE 2 MG/ML
0.5 INJECTION INTRAMUSCULAR; INTRAVENOUS; SUBCUTANEOUS
Qty: 0 | Refills: 0 | Status: DISCONTINUED | OUTPATIENT
Start: 2018-01-02 | End: 2018-01-03

## 2018-01-02 RX ORDER — SENNA PLUS 8.6 MG/1
2 TABLET ORAL AT BEDTIME
Qty: 0 | Refills: 0 | Status: DISCONTINUED | OUTPATIENT
Start: 2018-01-02 | End: 2018-01-03

## 2018-01-02 RX ORDER — CHLORPROMAZINE HCL 10 MG
25 TABLET ORAL ONCE
Qty: 0 | Refills: 0 | Status: DISCONTINUED | OUTPATIENT
Start: 2018-01-02 | End: 2018-01-02

## 2018-01-02 RX ORDER — MORPHINE SULFATE 50 MG/1
4 CAPSULE, EXTENDED RELEASE ORAL ONCE
Qty: 0 | Refills: 0 | Status: DISCONTINUED | OUTPATIENT
Start: 2018-01-02 | End: 2018-01-02

## 2018-01-02 RX ORDER — ONDANSETRON 8 MG/1
4 TABLET, FILM COATED ORAL EVERY 6 HOURS
Qty: 0 | Refills: 0 | Status: DISCONTINUED | OUTPATIENT
Start: 2018-01-02 | End: 2018-01-03

## 2018-01-02 RX ORDER — SODIUM CHLORIDE 9 MG/ML
1000 INJECTION INTRAMUSCULAR; INTRAVENOUS; SUBCUTANEOUS
Qty: 0 | Refills: 0 | Status: DISCONTINUED | OUTPATIENT
Start: 2018-01-02 | End: 2018-01-03

## 2018-01-02 RX ORDER — OXYCODONE AND ACETAMINOPHEN 5; 325 MG/1; MG/1
2 TABLET ORAL EVERY 6 HOURS
Qty: 0 | Refills: 0 | Status: DISCONTINUED | OUTPATIENT
Start: 2018-01-02 | End: 2018-01-03

## 2018-01-02 RX ORDER — DEXTROSE 50 % IN WATER 50 %
1 SYRINGE (ML) INTRAVENOUS ONCE
Qty: 0 | Refills: 0 | Status: DISCONTINUED | OUTPATIENT
Start: 2018-01-02 | End: 2018-01-03

## 2018-01-02 RX ORDER — BACLOFEN 100 %
10 POWDER (GRAM) MISCELLANEOUS ONCE
Qty: 0 | Refills: 0 | Status: COMPLETED | OUTPATIENT
Start: 2018-01-02 | End: 2018-01-02

## 2018-01-02 RX ORDER — OXYCODONE AND ACETAMINOPHEN 5; 325 MG/1; MG/1
1 TABLET ORAL EVERY 4 HOURS
Qty: 0 | Refills: 0 | Status: DISCONTINUED | OUTPATIENT
Start: 2018-01-02 | End: 2018-01-03

## 2018-01-02 RX ORDER — DEXTROSE 50 % IN WATER 50 %
12.5 SYRINGE (ML) INTRAVENOUS ONCE
Qty: 0 | Refills: 0 | Status: DISCONTINUED | OUTPATIENT
Start: 2018-01-02 | End: 2018-01-03

## 2018-01-02 RX ADMIN — Medication 10 MILLIGRAM(S): at 22:40

## 2018-01-02 RX ADMIN — CEFTRIAXONE 100 GRAM(S): 500 INJECTION, POWDER, FOR SOLUTION INTRAMUSCULAR; INTRAVENOUS at 22:26

## 2018-01-02 RX ADMIN — Medication 975 MILLIGRAM(S): at 22:15

## 2018-01-02 RX ADMIN — SODIUM CHLORIDE 2000 MILLILITER(S): 9 INJECTION INTRAMUSCULAR; INTRAVENOUS; SUBCUTANEOUS at 21:23

## 2018-01-02 RX ADMIN — MORPHINE SULFATE 4 MILLIGRAM(S): 50 CAPSULE, EXTENDED RELEASE ORAL at 21:22

## 2018-01-02 RX ADMIN — MORPHINE SULFATE 4 MILLIGRAM(S): 50 CAPSULE, EXTENDED RELEASE ORAL at 23:00

## 2018-01-02 RX ADMIN — ONDANSETRON 4 MILLIGRAM(S): 8 TABLET, FILM COATED ORAL at 23:00

## 2018-01-02 RX ADMIN — SODIUM CHLORIDE 2000 MILLILITER(S): 9 INJECTION INTRAMUSCULAR; INTRAVENOUS; SUBCUTANEOUS at 23:08

## 2018-01-02 RX ADMIN — Medication 975 MILLIGRAM(S): at 21:52

## 2018-01-02 NOTE — ED PROVIDER NOTE - ATTENDING CONTRIBUTION TO CARE
66 yo M with PMH of DM, DVT on Xarelto, kidney stones, R renal ca s/p partial nephrectomy p/w severe L flank pain since this evening. Pt was diagnosed with 7mm proximal L ureteral stone and admitted to urology on 12/31, managed conservatively with IVF and pain medications and discharged yesterday. Pt states he took oxycodone but the pain became more severe this evening. (+) nausea, fevers and chills. Pt AAO, NAD, RRR, CTA b/l, (+) left CVAT, Abd: soft/NT. Labs/ studies/CT noted. Pt given IVF and IV abx. Urology consulted and pt admitted. 68 yo M with PMH of DM, DVT on Xarelto, kidney stones, R renal ca s/p partial nephrectomy p/w severe L flank pain since this evening. Pt was diagnosed with 7mm proximal L ureteral stone and admitted to urology on 12/31, managed conservatively with IVF and pain medications and discharged yesterday. Pt states he took oxycodone but the pain became more severe this evening. (+) nausea, fevers and chills. Pt AAO, NAD, RRR, CTA b/l, (+) left CVAT, Abd: soft/NT. Labs/ studies noted. Pt given IVF and IV abx. Urology consulted and pt admitted.

## 2018-01-02 NOTE — ED ADULT NURSE NOTE - PMH
DM (diabetes mellitus)    DVT of lower extremity (deep venous thrombosis)  LLE OCT/2017  HTN (hypertension)    RBBB    Renal cancer, right  s/p R partial nephrectomy 2008  Renal stones    Tachycardia, unspecified  s/p ablation 2012

## 2018-01-02 NOTE — H&P ADULT - PROBLEM SELECTOR PLAN 1
-NPO  -IVF  -preop  -added on OR 1/3 cysto, L URS, left stent, poss litho  -DVT ppx (pt on Xerelto and took dose this morning, SCD to right LE only)  -bowel regimen  -ISS  -monitor for fever  -f/u PVR, monitor UO  -f/u UA  -f/u Ucxs, Bcxs

## 2018-01-02 NOTE — H&P ADULT - ATTENDING COMMENTS
seen and examined  +L CVA tenderness  subjective fevers  discussed with him and his daughter  for ureteral stent insertion  understands stent must be removed within 3 months

## 2018-01-02 NOTE — ED ADULT TRIAGE NOTE - CHIEF COMPLAINT QUOTE
I was diagnosed with kidney stones and am having severe pain.  The medications I was prescribed are not helping.

## 2018-01-02 NOTE — H&P ADULT - ASSESSMENT
67M with extensive PMH c/o increased left flank pain and subjective fever today with known 7mm left obstructing ureteral stone.

## 2018-01-02 NOTE — ED PROVIDER NOTE - OBJECTIVE STATEMENT
68 yo M with pmh of DM, DVT on xarelto, kidney stones, R renal ca s/p partial nephrectomy c/o severe L flank pain since this evening. Pt was diagnosed wit ha 7mm proximal L ureteral stone and admitted to urology on 12/31. Pt was managed conservatively with IVF and pain medications and discharged yesterday. Pt states he took oxycodone but the pain became more severe this evening. Associated with subjective fever, chills and nausea. Denies dysuria, hematuria.

## 2018-01-02 NOTE — ED PROVIDER NOTE - PHYSICAL EXAMINATION
CONSTITUTIONAL: Well-appearing; well-nourished; in no apparent distress.   HEAD: Normocephalic; atraumatic.   EYES: PERRL; EOM intact; conjunctiva and sclera clear  ENT: normal nose; no rhinorrhea; normal pharynx with no erythema or lesions.   NECK: Supple; non-tender; no LAD  CARDIOVASCULAR: Normal S1, S2; no murmurs, rubs, or gallops. Regular rate and rhythm.   RESPIRATORY: Breathing easily; breath sounds clear and equal bilaterally; no wheezes, rhonchi, or rales.  GI: Soft; non-distended; non-tender; no palpable organomegaly.   MSK: FROM at all extremities, normal tone   EXT: No cyanosis or edema; N/V intact  SKIN: Normal for age and race; warm; dry; good turgor; no apparent lesions or rash.   NEURO: A & O x 3; face symmetric; grossly unremarkable.   PSYCHOLOGICAL: The patient’s mood and manner are appropriate. CONSTITUTIONAL: ill appearing   HEAD: Normocephalic; atraumatic.   EYES: PERRL; EOM intact; conjunctiva and sclera clear  ENT: normal nose; no rhinorrhea; normal pharynx with no erythema or lesions.   NECK: Supple; non-tender; no LAD  CARDIOVASCULAR: Normal S1, S2; no murmurs, rubs, or gallops. Regular rate and rhythm.   RESPIRATORY: Breathing easily; breath sounds clear and equal bilaterally; no wheezes, rhonchi, or rales.  GI: Soft; non-distended; non-tender; no palpable organomegaly.   MSK: FROM at all extremities, normal tone   EXT: No cyanosis or edema; N/V intact  SKIN: warm to touch   NEURO: A & O x 3; face symmetric; grossly unremarkable.   PSYCHOLOGICAL: The patient’s mood and manner are appropriate.

## 2018-01-02 NOTE — ED PROVIDER NOTE - MEDICAL DECISION MAKING DETAILS
68 yo M with pmh of DM, DVT on xarelto, kidney stones, R renal ca s/p partial nephrectomy, 7mm proximal L ureteral stone c/o severe L flank pain since this evening. +subjective fever and chills. r/o infected stone. Pain control, labs, urology c/s.

## 2018-01-02 NOTE — H&P ADULT - HISTORY OF PRESENT ILLNESS
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 Pt monitored o/n and found to be stable/afebrile but poor surgical candidate so d/c'd with instructions to follow up as outpt for medical clearance and subsequent elective stent placement if could not pass stone on his own. Pt returns to ED today c/o left flank pain not relieved with pain meds, subjective fever and associated nausea. Pt's daughter at bedside reports pt did not drink much fluid or walk around much after d/c home. He denies shaking chills, objective fever, LOC, HA, CP, SOB, vomitus, d/c, hematuria, dysuria, LUTS.

## 2018-01-02 NOTE — H&P ADULT - PMH
DM (diabetes mellitus)    DVT of lower extremity (deep venous thrombosis)  LLE OCT/2017  HTN (hypertension)    RBBB    Renal cancer, right  s/p R partial nephrectomy 2008  Renal stones    Tachycardia, unspecified  s/p ablation 2012  Tremor of unknown origin

## 2018-01-03 DIAGNOSIS — Z79.01 LONG TERM (CURRENT) USE OF ANTICOAGULANTS: ICD-10-CM

## 2018-01-03 DIAGNOSIS — I82.432 ACUTE EMBOLISM AND THROMBOSIS OF LEFT POPLITEAL VEIN: ICD-10-CM

## 2018-01-03 DIAGNOSIS — I45.10 UNSPECIFIED RIGHT BUNDLE-BRANCH BLOCK: ICD-10-CM

## 2018-01-03 DIAGNOSIS — Z79.84 LONG TERM (CURRENT) USE OF ORAL HYPOGLYCEMIC DRUGS: ICD-10-CM

## 2018-01-03 DIAGNOSIS — N13.2 HYDRONEPHROSIS WITH RENAL AND URETERAL CALCULOUS OBSTRUCTION: ICD-10-CM

## 2018-01-03 DIAGNOSIS — I10 ESSENTIAL (PRIMARY) HYPERTENSION: ICD-10-CM

## 2018-01-03 DIAGNOSIS — E11.9 TYPE 2 DIABETES MELLITUS WITHOUT COMPLICATIONS: ICD-10-CM

## 2018-01-03 DIAGNOSIS — Z85.528 PERSONAL HISTORY OF OTHER MALIGNANT NEOPLASM OF KIDNEY: ICD-10-CM

## 2018-01-03 PROBLEM — N20.0 CALCULUS OF KIDNEY: Chronic | Status: ACTIVE | Noted: 2017-12-31

## 2018-01-03 LAB
ANION GAP SERPL CALC-SCNC: 13 MMOL/L — SIGNIFICANT CHANGE UP (ref 5–17)
ANION GAP SERPL CALC-SCNC: 15 MMOL/L — SIGNIFICANT CHANGE UP (ref 5–17)
APPEARANCE UR: (no result)
BASOPHILS NFR BLD AUTO: 0.1 % — SIGNIFICANT CHANGE UP (ref 0–2)
BILIRUB UR-MCNC: NEGATIVE — SIGNIFICANT CHANGE UP
BLD GP AB SCN SERPL QL: NEGATIVE — SIGNIFICANT CHANGE UP
BLD GP AB SCN SERPL QL: NEGATIVE — SIGNIFICANT CHANGE UP
BUN SERPL-MCNC: 18 MG/DL — SIGNIFICANT CHANGE UP (ref 7–23)
BUN SERPL-MCNC: 18 MG/DL — SIGNIFICANT CHANGE UP (ref 7–23)
CALCIUM SERPL-MCNC: 8 MG/DL — LOW (ref 8.4–10.5)
CALCIUM SERPL-MCNC: 8.1 MG/DL — LOW (ref 8.4–10.5)
CHLORIDE SERPL-SCNC: 103 MMOL/L — SIGNIFICANT CHANGE UP (ref 96–108)
CHLORIDE SERPL-SCNC: 105 MMOL/L — SIGNIFICANT CHANGE UP (ref 96–108)
CO2 SERPL-SCNC: 21 MMOL/L — LOW (ref 22–31)
CO2 SERPL-SCNC: 21 MMOL/L — LOW (ref 22–31)
COLOR SPEC: YELLOW — SIGNIFICANT CHANGE UP
CREAT SERPL-MCNC: 1.42 MG/DL — HIGH (ref 0.5–1.3)
CREAT SERPL-MCNC: 1.67 MG/DL — HIGH (ref 0.5–1.3)
DIFF PNL FLD: (no result)
EOSINOPHIL NFR BLD AUTO: 0.3 % — SIGNIFICANT CHANGE UP (ref 0–6)
GLUCOSE BLDC GLUCOMTR-MCNC: 122 MG/DL — HIGH (ref 70–99)
GLUCOSE BLDC GLUCOMTR-MCNC: 123 MG/DL — HIGH (ref 70–99)
GLUCOSE BLDC GLUCOMTR-MCNC: 182 MG/DL — HIGH (ref 70–99)
GLUCOSE BLDC GLUCOMTR-MCNC: 256 MG/DL — HIGH (ref 70–99)
GLUCOSE SERPL-MCNC: 132 MG/DL — HIGH (ref 70–99)
GLUCOSE SERPL-MCNC: 195 MG/DL — HIGH (ref 70–99)
GLUCOSE UR QL: NEGATIVE — SIGNIFICANT CHANGE UP
HBA1C BLD-MCNC: 7.6 % — HIGH (ref 4–5.6)
HCT VFR BLD CALC: 39.2 % — SIGNIFICANT CHANGE UP (ref 39–50)
HCT VFR BLD CALC: 39.5 % — SIGNIFICANT CHANGE UP (ref 39–50)
HGB BLD-MCNC: 12.4 G/DL — LOW (ref 13–17)
HGB BLD-MCNC: 12.5 G/DL — LOW (ref 13–17)
KETONES UR-MCNC: 15 MG/DL
LEUKOCYTE ESTERASE UR-ACNC: NEGATIVE — SIGNIFICANT CHANGE UP
LYMPHOCYTES # BLD AUTO: 12.7 % — LOW (ref 13–44)
MAGNESIUM SERPL-MCNC: 1.9 MG/DL — SIGNIFICANT CHANGE UP (ref 1.6–2.6)
MAGNESIUM SERPL-MCNC: 1.9 MG/DL — SIGNIFICANT CHANGE UP (ref 1.6–2.6)
MCHC RBC-ENTMCNC: 29.6 PG — SIGNIFICANT CHANGE UP (ref 27–34)
MCHC RBC-ENTMCNC: 29.9 PG — SIGNIFICANT CHANGE UP (ref 27–34)
MCHC RBC-ENTMCNC: 31.6 G/DL — LOW (ref 32–36)
MCHC RBC-ENTMCNC: 31.6 G/DL — LOW (ref 32–36)
MCV RBC AUTO: 93.6 FL — SIGNIFICANT CHANGE UP (ref 80–100)
MCV RBC AUTO: 94.5 FL — SIGNIFICANT CHANGE UP (ref 80–100)
MONOCYTES NFR BLD AUTO: 11 % — SIGNIFICANT CHANGE UP (ref 2–14)
NEUTROPHILS NFR BLD AUTO: 75.9 % — SIGNIFICANT CHANGE UP (ref 43–77)
NITRITE UR-MCNC: NEGATIVE — SIGNIFICANT CHANGE UP
PH UR: 5.5 — SIGNIFICANT CHANGE UP (ref 5–8)
PHOSPHATE SERPL-MCNC: 3.3 MG/DL — SIGNIFICANT CHANGE UP (ref 2.5–4.5)
PHOSPHATE SERPL-MCNC: 3.4 MG/DL — SIGNIFICANT CHANGE UP (ref 2.5–4.5)
PLATELET # BLD AUTO: 219 K/UL — SIGNIFICANT CHANGE UP (ref 150–400)
PLATELET # BLD AUTO: 223 K/UL — SIGNIFICANT CHANGE UP (ref 150–400)
POTASSIUM SERPL-MCNC: 4.2 MMOL/L — SIGNIFICANT CHANGE UP (ref 3.5–5.3)
POTASSIUM SERPL-MCNC: 4.7 MMOL/L — SIGNIFICANT CHANGE UP (ref 3.5–5.3)
POTASSIUM SERPL-SCNC: 4.2 MMOL/L — SIGNIFICANT CHANGE UP (ref 3.5–5.3)
POTASSIUM SERPL-SCNC: 4.7 MMOL/L — SIGNIFICANT CHANGE UP (ref 3.5–5.3)
PROT UR-MCNC: (no result) MG/DL
RBC # BLD: 4.15 M/UL — LOW (ref 4.2–5.8)
RBC # BLD: 4.22 M/UL — SIGNIFICANT CHANGE UP (ref 4.2–5.8)
RBC # FLD: 14 % — SIGNIFICANT CHANGE UP (ref 10.3–16.9)
RBC # FLD: 14.1 % — SIGNIFICANT CHANGE UP (ref 10.3–16.9)
RH IG SCN BLD-IMP: POSITIVE — SIGNIFICANT CHANGE UP
RH IG SCN BLD-IMP: POSITIVE — SIGNIFICANT CHANGE UP
SODIUM SERPL-SCNC: 137 MMOL/L — SIGNIFICANT CHANGE UP (ref 135–145)
SODIUM SERPL-SCNC: 141 MMOL/L — SIGNIFICANT CHANGE UP (ref 135–145)
SP GR SPEC: 1.02 — SIGNIFICANT CHANGE UP (ref 1–1.03)
UROBILINOGEN FLD QL: 0.2 E.U./DL — SIGNIFICANT CHANGE UP
WBC # BLD: 13.7 K/UL — HIGH (ref 3.8–10.5)
WBC # BLD: 15.1 K/UL — HIGH (ref 3.8–10.5)
WBC # FLD AUTO: 13.7 K/UL — HIGH (ref 3.8–10.5)
WBC # FLD AUTO: 15.1 K/UL — HIGH (ref 3.8–10.5)

## 2018-01-03 PROCEDURE — 74420 UROGRAPHY RTRGR +-KUB: CPT | Mod: 26

## 2018-01-03 PROCEDURE — 52332 CYSTOSCOPY AND TREATMENT: CPT | Mod: RT

## 2018-01-03 PROCEDURE — 93306 TTE W/DOPPLER COMPLETE: CPT | Mod: 26

## 2018-01-03 PROCEDURE — 99223 1ST HOSP IP/OBS HIGH 75: CPT | Mod: 57,25

## 2018-01-03 RX ORDER — ACETAMINOPHEN 500 MG
650 TABLET ORAL EVERY 6 HOURS
Qty: 0 | Refills: 0 | Status: DISCONTINUED | OUTPATIENT
Start: 2018-01-03 | End: 2018-01-05

## 2018-01-03 RX ORDER — HYDROMORPHONE HYDROCHLORIDE 2 MG/ML
0.5 INJECTION INTRAMUSCULAR; INTRAVENOUS; SUBCUTANEOUS ONCE
Qty: 0 | Refills: 0 | Status: DISCONTINUED | OUTPATIENT
Start: 2018-01-03 | End: 2018-01-03

## 2018-01-03 RX ORDER — RIVAROXABAN 15 MG-20MG
20 KIT ORAL EVERY 24 HOURS
Qty: 0 | Refills: 0 | Status: DISCONTINUED | OUTPATIENT
Start: 2018-01-03 | End: 2018-01-05

## 2018-01-03 RX ORDER — CEFTRIAXONE 500 MG/1
2 INJECTION, POWDER, FOR SOLUTION INTRAMUSCULAR; INTRAVENOUS EVERY 24 HOURS
Qty: 0 | Refills: 0 | Status: DISCONTINUED | OUTPATIENT
Start: 2018-01-03 | End: 2018-01-05

## 2018-01-03 RX ORDER — SODIUM CHLORIDE 9 MG/ML
1000 INJECTION, SOLUTION INTRAVENOUS
Qty: 0 | Refills: 0 | Status: DISCONTINUED | OUTPATIENT
Start: 2018-01-03 | End: 2018-01-05

## 2018-01-03 RX ORDER — ATROPA BELLADONNA AND OPIUM 16.2; 6 MG/1; MG/1
1 SUPPOSITORY RECTAL EVERY 8 HOURS
Qty: 0 | Refills: 0 | Status: DISCONTINUED | OUTPATIENT
Start: 2018-01-03 | End: 2018-01-05

## 2018-01-03 RX ORDER — SODIUM CHLORIDE 9 MG/ML
1000 INJECTION INTRAMUSCULAR; INTRAVENOUS; SUBCUTANEOUS
Qty: 0 | Refills: 0 | Status: DISCONTINUED | OUTPATIENT
Start: 2018-01-03 | End: 2018-01-03

## 2018-01-03 RX ORDER — HALOPERIDOL DECANOATE 100 MG/ML
2 INJECTION INTRAMUSCULAR ONCE
Qty: 0 | Refills: 0 | Status: COMPLETED | OUTPATIENT
Start: 2018-01-03 | End: 2018-01-03

## 2018-01-03 RX ORDER — DIPHENHYDRAMINE HCL 50 MG
25 CAPSULE ORAL AT BEDTIME
Qty: 0 | Refills: 0 | Status: DISCONTINUED | OUTPATIENT
Start: 2018-01-03 | End: 2018-01-04

## 2018-01-03 RX ORDER — DOCUSATE SODIUM 100 MG
100 CAPSULE ORAL THREE TIMES A DAY
Qty: 0 | Refills: 0 | Status: DISCONTINUED | OUTPATIENT
Start: 2018-01-03 | End: 2018-01-05

## 2018-01-03 RX ORDER — RIVAROXABAN 15 MG-20MG
20 KIT ORAL EVERY 24 HOURS
Qty: 0 | Refills: 0 | Status: DISCONTINUED | OUTPATIENT
Start: 2018-01-03 | End: 2018-01-03

## 2018-01-03 RX ORDER — SENNA PLUS 8.6 MG/1
2 TABLET ORAL AT BEDTIME
Qty: 0 | Refills: 0 | Status: DISCONTINUED | OUTPATIENT
Start: 2018-01-03 | End: 2018-01-05

## 2018-01-03 RX ORDER — INSULIN LISPRO 100/ML
VIAL (ML) SUBCUTANEOUS
Qty: 0 | Refills: 0 | Status: DISCONTINUED | OUTPATIENT
Start: 2018-01-03 | End: 2018-01-05

## 2018-01-03 RX ORDER — LABETALOL HCL 100 MG
10 TABLET ORAL ONCE
Qty: 0 | Refills: 0 | Status: DISCONTINUED | OUTPATIENT
Start: 2018-01-03 | End: 2018-01-03

## 2018-01-03 RX ORDER — OXYBUTYNIN CHLORIDE 5 MG
5 TABLET ORAL EVERY 8 HOURS
Qty: 0 | Refills: 0 | Status: DISCONTINUED | OUTPATIENT
Start: 2018-01-03 | End: 2018-01-05

## 2018-01-03 RX ORDER — LIDOCAINE 4 G/100G
1 CREAM TOPICAL EVERY 6 HOURS
Qty: 0 | Refills: 0 | Status: DISCONTINUED | OUTPATIENT
Start: 2018-01-03 | End: 2018-01-05

## 2018-01-03 RX ADMIN — ATROPA BELLADONNA AND OPIUM 1 SUPPOSITORY(S): 16.2; 6 SUPPOSITORY RECTAL at 13:22

## 2018-01-03 RX ADMIN — HYDROMORPHONE HYDROCHLORIDE 0.5 MILLIGRAM(S): 2 INJECTION INTRAMUSCULAR; INTRAVENOUS; SUBCUTANEOUS at 13:09

## 2018-01-03 RX ADMIN — Medication 4: at 22:46

## 2018-01-03 RX ADMIN — Medication 100 MILLIGRAM(S): at 22:46

## 2018-01-03 RX ADMIN — HYDROMORPHONE HYDROCHLORIDE 0.5 MILLIGRAM(S): 2 INJECTION INTRAMUSCULAR; INTRAVENOUS; SUBCUTANEOUS at 10:51

## 2018-01-03 RX ADMIN — SENNA PLUS 2 TABLET(S): 8.6 TABLET ORAL at 22:46

## 2018-01-03 RX ADMIN — RIVAROXABAN 20 MILLIGRAM(S): KIT at 17:05

## 2018-01-03 RX ADMIN — Medication 5 MILLIGRAM(S): at 10:57

## 2018-01-03 RX ADMIN — HYDROMORPHONE HYDROCHLORIDE 0.5 MILLIGRAM(S): 2 INJECTION INTRAMUSCULAR; INTRAVENOUS; SUBCUTANEOUS at 09:45

## 2018-01-03 RX ADMIN — HALOPERIDOL DECANOATE 2 MILLIGRAM(S): 100 INJECTION INTRAMUSCULAR at 09:40

## 2018-01-03 RX ADMIN — SODIUM CHLORIDE 150 MILLILITER(S): 9 INJECTION INTRAMUSCULAR; INTRAVENOUS; SUBCUTANEOUS at 00:38

## 2018-01-03 RX ADMIN — Medication: at 17:06

## 2018-01-03 RX ADMIN — Medication 25 MILLIGRAM(S): at 22:55

## 2018-01-03 RX ADMIN — OXYCODONE AND ACETAMINOPHEN 2 TABLET(S): 5; 325 TABLET ORAL at 01:02

## 2018-01-03 RX ADMIN — Medication 100 MILLIGRAM(S): at 13:08

## 2018-01-03 RX ADMIN — CEFTRIAXONE 100 GRAM(S): 500 INJECTION, POWDER, FOR SOLUTION INTRAMUSCULAR; INTRAVENOUS at 22:47

## 2018-01-03 RX ADMIN — OXYCODONE AND ACETAMINOPHEN 2 TABLET(S): 5; 325 TABLET ORAL at 00:23

## 2018-01-03 RX ADMIN — LIDOCAINE 1 APPLICATION(S): 4 CREAM TOPICAL at 10:27

## 2018-01-03 RX ADMIN — HYDROMORPHONE HYDROCHLORIDE 0.5 MILLIGRAM(S): 2 INJECTION INTRAMUSCULAR; INTRAVENOUS; SUBCUTANEOUS at 13:08

## 2018-01-03 RX ADMIN — LIDOCAINE 1 APPLICATION(S): 4 CREAM TOPICAL at 13:22

## 2018-01-03 NOTE — PROGRESS NOTE ADULT - SUBJECTIVE AND OBJECTIVE BOX
PRE OP NOTE    Urology Preop Note     Diagnosis: obstructing ureteral stone  Procedure: cysto, L URS, L stent, possible lithotripsy  Surgeon: Dr. Eagle     S: 67M with extensive PMH returned to ED with left flank pain and subjective fever 2/2 7mm obstructing stone  O:   T(C): 38.1 (18 @ 21:43), Max: 38.1 (18 @ 21:43)  HR: 105 (18 @ 23:38) (102 - 107)  BP: 173/79 (18 @ 23:38) (173/79 - 176/105)  RR: 18 (18 @ 23:38) (18 - 18)  SpO2: 96% (18 @ 23:38) (96% - 99%)  Wt(kg): --        Ucx: collected    EK/31 EKG reviewed, new EKG ordered     CXR:  CXR reviewed, new CXR ordered    67yMale admitted for flank pain d/t ureteral stone, going to OR for cysto, L URS, L stent and possible litho  - NPO   - IV fluids  - consent obtained

## 2018-01-03 NOTE — BRIEF OPERATIVE NOTE - PROCEDURE
<<-----Click on this checkbox to enter Procedure Cystoscopy with insertion of ureteral stent  01/03/2018  left  Active  JSARCONA

## 2018-01-03 NOTE — PROGRESS NOTE ADULT - SUBJECTIVE AND OBJECTIVE BOX
Postop:  Pt denies chest pain, SOB, NV or severe abdominal pain       Vital Signs Last 24 Hrs  T(C): 36.8 (03 Jan 2018 09:22), Max: 38.1 (02 Jan 2018 21:43)  T(F): 98.2 (03 Jan 2018 09:22), Max: 100.5 (02 Jan 2018 21:43)  HR: 74 (03 Jan 2018 11:23) (74 - 120)  BP: 160/75 (03 Jan 2018 11:23) (122/64 - 181/116)  BP(mean): 106 (03 Jan 2018 10:34) (97 - 147)  RR: 16 (03 Jan 2018 11:23) (8 - 34)  SpO2: 98% (03 Jan 2018 11:23) (95% - 99%)    I&O's Summary    02 Jan 2018 07:01  -  03 Jan 2018 07:00  --------------------------------------------------------  IN: 1200 mL / OUT: 820 mL / NET: 380 mL    03 Jan 2018 07:01  -  03 Jan 2018 11:40  --------------------------------------------------------  IN: 0 mL / OUT: 250 mL / NET: -250 mL        Gen: NAD    Abd: abdomen moderately distended, NTTP    : stallings draining clear urine                          12.5   15.1  )-----------( 223      ( 03 Jan 2018 06:31 )             39.5     01-03    137  |  103  |  18  ----------------------------<  132<H>  4.2   |  21<L>  |  1.67<H>    Ca    8.0<L>      03 Jan 2018 06:30  Phos  3.3     01-03  Mg     1.9     01-03      culturesCulture Results:   Less than 10,000 cols/cc; Insignificant amount of growth. (12-31 @ 18:26)      A/P: 67M hx 7mm l ureteral stone, temp 100.5 s/p cysto, L stent placement 1/3  1- stallings- monitor uop  2- regular diet  3- OOB amb, IS  4- Cards: ECHO, will see as an outpatient  5- monitor distended abdomen  6- fu Ucx, Bcx

## 2018-01-03 NOTE — PROGRESS NOTE ADULT - SUBJECTIVE AND OBJECTIVE BOX
AM NOTE    Patient is a 67y old  Male who presents with a chief complaint of left flank pain with subjective fever (02 Jan 2018 22:44)    No acute events o/n pt remained afebrile      Vital Signs Last 24 Hrs  T(C): 37.2 (03 Jan 2018 00:00), Max: 38.1 (02 Jan 2018 21:43)  T(F): 99 (03 Jan 2018 00:00), Max: 100.5 (02 Jan 2018 21:43)  HR: 105 (02 Jan 2018 23:38) (102 - 107)  BP: 173/79 (02 Jan 2018 23:38) (173/79 - 176/105)  BP(mean): --  RR: 18 (02 Jan 2018 23:38) (18 - 18)  SpO2: 96% (02 Jan 2018 23:38) (96% - 99%)    I&O's Summary    02 Jan 2018 07:01  -  03 Jan 2018 04:10  --------------------------------------------------------  IN: 750 mL / OUT: 400 mL / NET: 350 mL        Gen: NAD    Abd: +obese, softly distended, nttp, no guarding  mild left CVA ttp, right nttp  : +BRP                          14.6   15.8  )-----------( 254      ( 02 Jan 2018 21:20 )             46.1     01-02    139  |  100  |  21  ----------------------------<  166<H>  4.2   |  23  |  1.83<H>    Ca    9.1      02 Jan 2018 21:20  Phos  3.0     01-01  Mg     1.8     01-01      culturesCulture Results:   Less than 10,000 cols/cc; Insignificant amount of growth. (12-31 @ 18:26)      A/P  67M with left obstructing stone returned to ED with pain and subjected fever. Pt pre-op complete, consented and added on for ureteral stent today.  -NPO, IVF  -monitor for fever  -pain meds PRN  -monitor UO  -f/u approval of steven Murphy dose   -dvt ppx  -bowel regimen  -OOB/IS  -ISS

## 2018-01-04 ENCOUNTER — TRANSCRIPTION ENCOUNTER (OUTPATIENT)
Age: 68
End: 2018-01-04

## 2018-01-04 DIAGNOSIS — N20.1 CALCULUS OF URETER: ICD-10-CM

## 2018-01-04 LAB
ANION GAP SERPL CALC-SCNC: 11 MMOL/L — SIGNIFICANT CHANGE UP (ref 5–17)
BASOPHILS NFR BLD AUTO: 0.1 % — SIGNIFICANT CHANGE UP (ref 0–2)
BUN SERPL-MCNC: 21 MG/DL — SIGNIFICANT CHANGE UP (ref 7–23)
CALCIUM SERPL-MCNC: 8.7 MG/DL — SIGNIFICANT CHANGE UP (ref 8.4–10.5)
CHLORIDE SERPL-SCNC: 103 MMOL/L — SIGNIFICANT CHANGE UP (ref 96–108)
CO2 SERPL-SCNC: 22 MMOL/L — SIGNIFICANT CHANGE UP (ref 22–31)
CREAT SERPL-MCNC: 1.41 MG/DL — HIGH (ref 0.5–1.3)
CULTURE RESULTS: NO GROWTH — SIGNIFICANT CHANGE UP
CULTURE RESULTS: NO GROWTH — SIGNIFICANT CHANGE UP
EOSINOPHIL NFR BLD AUTO: 0.2 % — SIGNIFICANT CHANGE UP (ref 0–6)
GLUCOSE BLDC GLUCOMTR-MCNC: 119 MG/DL — HIGH (ref 70–99)
GLUCOSE BLDC GLUCOMTR-MCNC: 146 MG/DL — HIGH (ref 70–99)
GLUCOSE BLDC GLUCOMTR-MCNC: 176 MG/DL — HIGH (ref 70–99)
GLUCOSE BLDC GLUCOMTR-MCNC: 182 MG/DL — HIGH (ref 70–99)
GLUCOSE SERPL-MCNC: 217 MG/DL — HIGH (ref 70–99)
HCT VFR BLD CALC: 39.2 % — SIGNIFICANT CHANGE UP (ref 39–50)
HGB BLD-MCNC: 12.8 G/DL — LOW (ref 13–17)
LYMPHOCYTES # BLD AUTO: 12.8 % — LOW (ref 13–44)
MAGNESIUM SERPL-MCNC: 2.2 MG/DL — SIGNIFICANT CHANGE UP (ref 1.6–2.6)
MCHC RBC-ENTMCNC: 30.7 PG — SIGNIFICANT CHANGE UP (ref 27–34)
MCHC RBC-ENTMCNC: 32.7 G/DL — SIGNIFICANT CHANGE UP (ref 32–36)
MCV RBC AUTO: 94 FL — SIGNIFICANT CHANGE UP (ref 80–100)
MONOCYTES NFR BLD AUTO: 11.4 % — SIGNIFICANT CHANGE UP (ref 2–14)
NEUTROPHILS NFR BLD AUTO: 75.5 % — SIGNIFICANT CHANGE UP (ref 43–77)
PHOSPHATE SERPL-MCNC: 3 MG/DL — SIGNIFICANT CHANGE UP (ref 2.5–4.5)
PLATELET # BLD AUTO: 239 K/UL — SIGNIFICANT CHANGE UP (ref 150–400)
POTASSIUM SERPL-MCNC: 4.2 MMOL/L — SIGNIFICANT CHANGE UP (ref 3.5–5.3)
POTASSIUM SERPL-SCNC: 4.2 MMOL/L — SIGNIFICANT CHANGE UP (ref 3.5–5.3)
RBC # BLD: 4.17 M/UL — LOW (ref 4.2–5.8)
RBC # FLD: 14.1 % — SIGNIFICANT CHANGE UP (ref 10.3–16.9)
SODIUM SERPL-SCNC: 136 MMOL/L — SIGNIFICANT CHANGE UP (ref 135–145)
SPECIMEN SOURCE: SIGNIFICANT CHANGE UP
SPECIMEN SOURCE: SIGNIFICANT CHANGE UP
WBC # BLD: 12.8 K/UL — HIGH (ref 3.8–10.5)
WBC # FLD AUTO: 12.8 K/UL — HIGH (ref 3.8–10.5)

## 2018-01-04 RX ORDER — OXYCODONE AND ACETAMINOPHEN 5; 325 MG/1; MG/1
1 TABLET ORAL EVERY 6 HOURS
Qty: 0 | Refills: 0 | Status: DISCONTINUED | OUTPATIENT
Start: 2018-01-04 | End: 2018-01-05

## 2018-01-04 RX ORDER — METOPROLOL TARTRATE 50 MG
25 TABLET ORAL
Qty: 0 | Refills: 0 | Status: DISCONTINUED | OUTPATIENT
Start: 2018-01-04 | End: 2018-01-05

## 2018-01-04 RX ORDER — ZALEPLON 10 MG
5 CAPSULE ORAL AT BEDTIME
Qty: 0 | Refills: 0 | Status: DISCONTINUED | OUTPATIENT
Start: 2018-01-04 | End: 2018-01-05

## 2018-01-04 RX ORDER — OXYCODONE AND ACETAMINOPHEN 5; 325 MG/1; MG/1
2 TABLET ORAL EVERY 6 HOURS
Qty: 0 | Refills: 0 | Status: DISCONTINUED | OUTPATIENT
Start: 2018-01-04 | End: 2018-01-05

## 2018-01-04 RX ADMIN — Medication 100 MILLIGRAM(S): at 13:16

## 2018-01-04 RX ADMIN — Medication 25 MILLIGRAM(S): at 13:05

## 2018-01-04 RX ADMIN — Medication 100 MILLIGRAM(S): at 22:16

## 2018-01-04 RX ADMIN — OXYCODONE AND ACETAMINOPHEN 2 TABLET(S): 5; 325 TABLET ORAL at 13:59

## 2018-01-04 RX ADMIN — Medication 2: at 07:51

## 2018-01-04 RX ADMIN — CEFTRIAXONE 100 GRAM(S): 500 INJECTION, POWDER, FOR SOLUTION INTRAMUSCULAR; INTRAVENOUS at 22:15

## 2018-01-04 RX ADMIN — OXYCODONE AND ACETAMINOPHEN 2 TABLET(S): 5; 325 TABLET ORAL at 13:05

## 2018-01-04 RX ADMIN — Medication 100 MILLIGRAM(S): at 07:54

## 2018-01-04 RX ADMIN — OXYCODONE AND ACETAMINOPHEN 2 TABLET(S): 5; 325 TABLET ORAL at 20:53

## 2018-01-04 RX ADMIN — OXYCODONE AND ACETAMINOPHEN 2 TABLET(S): 5; 325 TABLET ORAL at 21:53

## 2018-01-04 RX ADMIN — Medication 2: at 12:10

## 2018-01-04 RX ADMIN — RIVAROXABAN 20 MILLIGRAM(S): KIT at 17:51

## 2018-01-04 NOTE — DISCHARGE NOTE ADULT - HOSPITAL COURSE
67M with 7mm left ureteral stone presented with uncontrollable pain and subjective fever 1/2. He underwent cystoscopy, left URS and left stent placement 1/3 without complication. Pt is hemodynamically stable with AVSS, ambulating, tolerating PO and with pain controlled. He has passed a TOV and ready for d/c home.

## 2018-01-04 NOTE — DISCHARGE NOTE ADULT - MEDICATION SUMMARY - MEDICATIONS TO TAKE
I will START or STAY ON the medications listed below when I get home from the hospital:    Percocet 5/325 oral tablet  -- 1 tab(s) by mouth every 6 hours, As Needed -for severe pain MDD:4   -- Caution federal law prohibits the transfer of this drug to any person other  than the person for whom it was prescribed.  May cause drowsiness.  Alcohol may intensify this effect.  Use care when operating dangerous machinery.  This prescription cannot be refilled.  This product contains acetaminophen.  Do not use  with any other product containing acetaminophen to prevent possible liver damage.  Using more of this medication than prescribed may cause serious breathing problems.    -- Indication: For for pain    Flomax 0.4 mg oral capsule  -- 1 cap(s) by mouth once a day   -- It is very important that you take or use this exactly as directed.  Do not skip doses or discontinue unless directed by your doctor.  May cause drowsiness.  Alcohol may intensify this effect.  Use care when operating dangerous machinery.  Some non-prescription drugs may aggravate your condition.  Read all labels carefully.  If a warning appears, check with your doctor before taking.  Swallow whole.  Do not crush.  Take with food or milk.    -- Indication: For Urine flow    Xarelto 20 mg oral tablet  -- 1 tab(s) by mouth once a day (in the morning)  -- Indication: For home med    metFORMIN  -- Indication: For home med    Levemir  -- 48 unit(s) subcutaneous once a day (at bedtime)  -- Indication: For home med    metoprolol  -- orally once a day  -- Indication: For home med    Colace 100 mg oral capsule  -- 1 cap(s) by mouth 2 times a day, As Needed -for constipation   -- Medication should be taken with plenty of water.    -- Indication: For for constipation    Bactrim  mg-160 mg oral tablet  -- 1 tab(s) by mouth 2 times a day   -- Avoid prolonged or excessive exposure to direct and/or artificial sunlight while taking this medication.  Finish all this medication unless otherwise directed by prescriber.  Medication should be taken with plenty of water.    -- Indication: For infection prophylaxis    oxybutynin 5 mg oral tablet  -- 1 tab(s) by mouth every 8 hours, As needed, Bladder spasms  -- Indication: For for stent pain/bladder spasms

## 2018-01-04 NOTE — DISCHARGE NOTE ADULT - CARE PLAN
Principal Discharge DX:	Ureteral stone  Goal:	hydration, ambulation, pain and infection control  Instructions for follow-up, activity and diet:	You have a ureteral stent in place on the left side that will need to be removed by your urologist, please follow up as discussed. You also had an ECHO completed while in the hospital and should follow up with cardiologist Dr. Silverman as an outpatient. Stay well hydrated and active. If you have fever > 100.4, uncontrollable pain or vomiting or any other acute concerns, go to ER or call MD.

## 2018-01-04 NOTE — DIETITIAN INITIAL EVALUATION ADULT. - OTHER INFO
66yo M s/p cysto, L ureteral stent POD #1; h/o fever. Pt currently on CSTCHO w/ evening snack and tolerating PO. Endorses good appetite, stating he consumed >75% breakfast this am. Denies GI distress except constipation, last BM was Sunday night. Noted pt is ordered for bowel regimen. Reviewed purpose of current diet order. Pt had several misconceptions about conceptions about concentrated sugar/starchy foods. Discussed adjustments to usual meals (i.e, portion sizes of rice, optimal types of chips). Encouraged pt to focus on protein sources and making vegetables the base of dishes. Written edu was provided on CHO counting. NKFA or dietary restricitons. Documented wt is 206lb. States last wt was 189lbs. Please obtain new standing wt for accuracy. Skin: WDL, GI: distension per flowsheet.

## 2018-01-04 NOTE — DISCHARGE NOTE ADULT - PATIENT PORTAL LINK FT
“You can access the Follow[facility] Patient Portal, offered by [facility], by registering with the following website: [email or web address]”

## 2018-01-04 NOTE — PROGRESS NOTE ADULT - PROBLEM SELECTOR PLAN 1
-stable  -OOB  -f/u labs  -f/u cultures  -continue antibx -stable  -OOB  -f/u labs  -f/u cultures  -continue antibx  -TOV , f/u PVR

## 2018-01-04 NOTE — PROGRESS NOTE ADULT - SUBJECTIVE AND OBJECTIVE BOX
INTERVAL HPI/OVERNIGHT EVENTS:  No acute events overnight.    VITALS:    T(F): 99.5 (18 @ 22:32), Max: 99.5 (18 @ 22:32)  HR: 72 (18 @ 00:56) (72 - 120)  BP: 151/69 (18 @ 00:56) (122/64 - 181/116)  RR: 11 (18 @ 00:56) (8 - 34)  SpO2: 96% (18 @ 00:56) (95% - 98%)  Wt(kg): --    I&O's Detail    2018 07:01  -  2018 07:00  --------------------------------------------------------  IN:    sodium chloride 0.9%: 1200 mL  Total IN: 1200 mL    OUT:    Voided: 820 mL  Total OUT: 820 mL    Total NET: 380 mL      2018 07:01  -  2018 04:10  --------------------------------------------------------  IN:    IV PiggyBack: 50 mL  Total IN: 50 mL    OUT:    Indwelling Catheter - Urethral: 1650 mL  Total OUT: 1650 mL    Total NET: -1600 mL          MEDICATIONS:    ANTIBIOTICS:  cefTRIAXone   IVPB 2 Gram(s) IV Intermittent every 24 hours      PAIN CONTROL:  acetaminophen   Tablet 650 milliGRAM(s) Oral every 6 hours PRN  acetaminophen   Tablet. 650 milliGRAM(s) Oral every 6 hours PRN  belladonna 16.2 mG/opium 60 mg Suppository 1 Suppository(s) Rectal every 8 hours PRN  diphenhydrAMINE   Capsule 25 milliGRAM(s) Oral at bedtime PRN       MEDS:  oxybutynin 5 milliGRAM(s) Oral every 8 hours PRN      HEME/ONC  rivaroxaban 20 milliGRAM(s) Oral every 24 hours        PHYSICAL EXAM:  General: No acute distress.  Alert and Oriented  Abdominal Exam: soft, obese, NT, ND   Exam: FC intact, urine clear      LABS:                        12.4   13.7  )-----------( 219      ( 2018 15:46 )             39.2         141  |  105  |  18  ----------------------------<  195<H>  4.7   |  21<L>  |  1.42<H>    Ca    8.1<L>      2018 15:46  Phos  3.4       Mg     1.9           PT/INR - ( 2018 22:43 )   PT: 28.2 sec;   INR: 2.49          PTT - ( 2018 22:43 )  PTT:36.4 sec  Urinalysis Basic - ( 2018 00:01 )    Color: Yellow / Appearance: SL Cloudy / S.020 / pH: x  Gluc: x / Ketone: 15 mg/dL  / Bili: Negative / Urobili: 0.2 E.U./dL   Blood: x / Protein: Trace mg/dL / Nitrite: NEGATIVE   Leuk Esterase: NEGATIVE / RBC: Many /HPF / WBC 5-10 /HPF   Sq Epi: x / Non Sq Epi: 0-5 /HPF / Bacteria: Present /HPF        RADIOLOGY & ADDITIONAL TESTS:

## 2018-01-04 NOTE — DISCHARGE NOTE ADULT - CARE PROVIDERS DIRECT ADDRESSES
[email or web address]

## 2018-01-04 NOTE — DIETITIAN INITIAL EVALUATION ADULT. - ADHERENCE
poor/per dietary recall pt does not adhere to CSTCHO diet outside of [facility].

## 2018-01-04 NOTE — DISCHARGE NOTE ADULT - PLAN OF CARE
hydration, ambulation, pain and infection control You have a ureteral stent in place on the left side that will need to be removed by your urologist, please follow up as discussed. You also had an ECHO completed while in the hospital and should follow up with cardiologist Dr. Silverman as an outpatient. Stay well hydrated and active. If you have fever > 100.4, uncontrollable pain or vomiting or any other acute concerns, go to ER or call MD.

## 2018-01-04 NOTE — DISCHARGE NOTE ADULT - CARE PROVIDER_API CALL
Jomar Eagle), Urology  [address] [place] [address]  Phone: [phone]  Fax: [phone]    More Silverman), Internal Medicine  [address] [id number]  Phone: [phone]  Fax: [phone]

## 2018-01-04 NOTE — DIETITIAN INITIAL EVALUATION ADULT. - ENERGY NEEDS
Height: 5'8" Weight: 206lbs, IBW 154lbs+/-10%, %%, BMI 31.3  IBW used for calculations as pt >120% of IBW   Nutrient needs based on [facility] standards of care for maintenance in older adults.

## 2018-01-04 NOTE — DIETITIAN INITIAL EVALUATION ADULT. - NS AS NUTRI INTERV ED CONTENT
Purpose of the nutrition education/diabetes medical nutrition therapy, reviewed portion sizes, CHO counting, altering current dietary habits

## 2018-01-05 VITALS — TEMPERATURE: 98 F

## 2018-01-05 LAB
ANION GAP SERPL CALC-SCNC: 15 MMOL/L — SIGNIFICANT CHANGE UP (ref 5–17)
BASOPHILS NFR BLD AUTO: 0.4 % — SIGNIFICANT CHANGE UP (ref 0–2)
BUN SERPL-MCNC: 22 MG/DL — SIGNIFICANT CHANGE UP (ref 7–23)
CALCIUM SERPL-MCNC: 8.5 MG/DL — SIGNIFICANT CHANGE UP (ref 8.4–10.5)
CHLORIDE SERPL-SCNC: 101 MMOL/L — SIGNIFICANT CHANGE UP (ref 96–108)
CO2 SERPL-SCNC: 22 MMOL/L — SIGNIFICANT CHANGE UP (ref 22–31)
CREAT SERPL-MCNC: 1.35 MG/DL — HIGH (ref 0.5–1.3)
EOSINOPHIL NFR BLD AUTO: 1.2 % — SIGNIFICANT CHANGE UP (ref 0–6)
GLUCOSE BLDC GLUCOMTR-MCNC: 151 MG/DL — HIGH (ref 70–99)
GLUCOSE BLDC GLUCOMTR-MCNC: 171 MG/DL — HIGH (ref 70–99)
GLUCOSE BLDC GLUCOMTR-MCNC: 179 MG/DL — HIGH (ref 70–99)
GLUCOSE SERPL-MCNC: 165 MG/DL — HIGH (ref 70–99)
HCT VFR BLD CALC: 41.8 % — SIGNIFICANT CHANGE UP (ref 39–50)
HGB BLD-MCNC: 13.1 G/DL — SIGNIFICANT CHANGE UP (ref 13–17)
LYMPHOCYTES # BLD AUTO: 24.5 % — SIGNIFICANT CHANGE UP (ref 13–44)
MAGNESIUM SERPL-MCNC: 2.1 MG/DL — SIGNIFICANT CHANGE UP (ref 1.6–2.6)
MCHC RBC-ENTMCNC: 29.2 PG — SIGNIFICANT CHANGE UP (ref 27–34)
MCHC RBC-ENTMCNC: 31.3 G/DL — LOW (ref 32–36)
MCV RBC AUTO: 93.3 FL — SIGNIFICANT CHANGE UP (ref 80–100)
MONOCYTES NFR BLD AUTO: 10.3 % — SIGNIFICANT CHANGE UP (ref 2–14)
NEUTROPHILS NFR BLD AUTO: 63.6 % — SIGNIFICANT CHANGE UP (ref 43–77)
PHOSPHATE SERPL-MCNC: 3.3 MG/DL — SIGNIFICANT CHANGE UP (ref 2.5–4.5)
PLATELET # BLD AUTO: 303 K/UL — SIGNIFICANT CHANGE UP (ref 150–400)
POTASSIUM SERPL-MCNC: 3.8 MMOL/L — SIGNIFICANT CHANGE UP (ref 3.5–5.3)
POTASSIUM SERPL-SCNC: 3.8 MMOL/L — SIGNIFICANT CHANGE UP (ref 3.5–5.3)
RBC # BLD: 4.48 M/UL — SIGNIFICANT CHANGE UP (ref 4.2–5.8)
RBC # FLD: 13.9 % — SIGNIFICANT CHANGE UP (ref 10.3–16.9)
SODIUM SERPL-SCNC: 138 MMOL/L — SIGNIFICANT CHANGE UP (ref 135–145)
WBC # BLD: 11.4 K/UL — HIGH (ref 3.8–10.5)
WBC # FLD AUTO: 11.4 K/UL — HIGH (ref 3.8–10.5)

## 2018-01-05 PROCEDURE — 85027 COMPLETE CBC AUTOMATED: CPT

## 2018-01-05 PROCEDURE — 83036 HEMOGLOBIN GLYCOSYLATED A1C: CPT

## 2018-01-05 PROCEDURE — C1758: CPT

## 2018-01-05 PROCEDURE — 80048 BASIC METABOLIC PNL TOTAL CA: CPT

## 2018-01-05 PROCEDURE — 86900 BLOOD TYPING SEROLOGIC ABO: CPT

## 2018-01-05 PROCEDURE — 84100 ASSAY OF PHOSPHORUS: CPT

## 2018-01-05 PROCEDURE — 36415 COLL VENOUS BLD VENIPUNCTURE: CPT

## 2018-01-05 PROCEDURE — 85025 COMPLETE CBC W/AUTO DIFF WBC: CPT

## 2018-01-05 PROCEDURE — 86850 RBC ANTIBODY SCREEN: CPT

## 2018-01-05 PROCEDURE — C2617: CPT

## 2018-01-05 PROCEDURE — 99285 EMERGENCY DEPT VISIT HI MDM: CPT | Mod: 25

## 2018-01-05 PROCEDURE — 83735 ASSAY OF MAGNESIUM: CPT

## 2018-01-05 PROCEDURE — 76000 FLUOROSCOPY <1 HR PHYS/QHP: CPT

## 2018-01-05 PROCEDURE — 86901 BLOOD TYPING SEROLOGIC RH(D): CPT

## 2018-01-05 PROCEDURE — C8929: CPT

## 2018-01-05 PROCEDURE — 85730 THROMBOPLASTIN TIME PARTIAL: CPT

## 2018-01-05 PROCEDURE — C1769: CPT

## 2018-01-05 PROCEDURE — 81001 URINALYSIS AUTO W/SCOPE: CPT

## 2018-01-05 PROCEDURE — 85610 PROTHROMBIN TIME: CPT

## 2018-01-05 PROCEDURE — 87086 URINE CULTURE/COLONY COUNT: CPT

## 2018-01-05 PROCEDURE — 87040 BLOOD CULTURE FOR BACTERIA: CPT

## 2018-01-05 PROCEDURE — 96374 THER/PROPH/DIAG INJ IV PUSH: CPT

## 2018-01-05 PROCEDURE — 82962 GLUCOSE BLOOD TEST: CPT

## 2018-01-05 RX ORDER — OXYBUTYNIN CHLORIDE 5 MG
1 TABLET ORAL
Qty: 15 | Refills: 0 | OUTPATIENT
Start: 2018-01-05 | End: 2018-01-09

## 2018-01-05 RX ORDER — AZTREONAM 2 G
1 VIAL (EA) INJECTION
Qty: 14 | Refills: 0 | OUTPATIENT
Start: 2018-01-05 | End: 2018-01-11

## 2018-01-05 RX ADMIN — Medication 25 MILLIGRAM(S): at 12:47

## 2018-01-05 RX ADMIN — RIVAROXABAN 20 MILLIGRAM(S): KIT at 14:08

## 2018-01-05 RX ADMIN — Medication 2: at 07:56

## 2018-01-05 RX ADMIN — Medication 2: at 12:47

## 2018-01-05 RX ADMIN — Medication 25 MILLIGRAM(S): at 01:52

## 2018-01-05 RX ADMIN — OXYCODONE AND ACETAMINOPHEN 2 TABLET(S): 5; 325 TABLET ORAL at 14:06

## 2018-01-05 RX ADMIN — Medication 100 MILLIGRAM(S): at 13:51

## 2018-01-05 RX ADMIN — Medication 100 MILLIGRAM(S): at 06:18

## 2018-01-05 NOTE — PROGRESS NOTE ADULT - SUBJECTIVE AND OBJECTIVE BOX
INTERVAL HPI/OVERNIGHT EVENTS:  No acute events overnight.    VITALS:    T(F): 98.2 (01-05-18 @ 05:21), Max: 99.7 (01-04-18 @ 10:00)  HR: 64 (01-05-18 @ 04:50) (64 - 94)  BP: 154/73 (01-05-18 @ 04:50) (128/66 - 183/77)  RR: 14 (01-05-18 @ 04:50) (14 - 16)  SpO2: 97% (01-05-18 @ 04:50) (96% - 99%)  Wt(kg): --    I&O's Detail    03 Jan 2018 07:01  -  04 Jan 2018 07:00  --------------------------------------------------------  IN:    IV PiggyBack: 50 mL  Total IN: 50 mL    OUT:    Indwelling Catheter - Urethral: 2100 mL  Total OUT: 2100 mL    Total NET: -2050 mL      04 Jan 2018 07:01  -  05 Jan 2018 06:36  --------------------------------------------------------  IN:  Total IN: 0 mL    OUT:    Voided: 700 mL  Total OUT: 700 mL    Total NET: -700 mL          MEDICATIONS:    ANTIBIOTICS:  cefTRIAXone   IVPB 2 Gram(s) IV Intermittent every 24 hours      PAIN CONTROL:  acetaminophen   Tablet 650 milliGRAM(s) Oral every 6 hours PRN  acetaminophen   Tablet. 650 milliGRAM(s) Oral every 6 hours PRN  belladonna 16.2 mG/opium 60 mg Suppository 1 Suppository(s) Rectal every 8 hours PRN  oxyCODONE    5 mG/acetaminophen 325 mG 1 Tablet(s) Oral every 6 hours PRN  oxyCODONE    5 mG/acetaminophen 325 mG 2 Tablet(s) Oral every 6 hours PRN  zaleplon 5 milliGRAM(s) Oral at bedtime PRN       MEDS:  oxybutynin 5 milliGRAM(s) Oral every 8 hours PRN      HEME/ONC  rivaroxaban 20 milliGRAM(s) Oral every 24 hours        PHYSICAL EXAM:  General: No acute distress.  Alert and Oriented  Abdominal Exam: soft, NT, ND   Exam: BRP      LABS:                        13.1   11.4  )-----------( 303      ( 05 Jan 2018 06:04 )             41.8     01-05    138  |  101  |  22  ----------------------------<  165<H>  3.8   |  22  |  1.35<H>    Ca    8.5      05 Jan 2018 06:04  Phos  3.3     01-05  Mg     2.1     01-05            RADIOLOGY & ADDITIONAL TESTS:

## 2018-01-08 PROBLEM — Z00.00 ENCOUNTER FOR PREVENTIVE HEALTH EXAMINATION: Status: ACTIVE | Noted: 2018-01-08

## 2018-01-08 LAB
CULTURE RESULTS: SIGNIFICANT CHANGE UP
CULTURE RESULTS: SIGNIFICANT CHANGE UP
SPECIMEN SOURCE: SIGNIFICANT CHANGE UP
SPECIMEN SOURCE: SIGNIFICANT CHANGE UP

## 2018-01-17 ENCOUNTER — APPOINTMENT (OUTPATIENT)
Dept: UROLOGY | Facility: CLINIC | Age: 68
End: 2018-01-17
Payer: MEDICARE

## 2018-01-17 ENCOUNTER — TRANSCRIPTION ENCOUNTER (OUTPATIENT)
Age: 68
End: 2018-01-17

## 2018-01-17 VITALS
HEIGHT: 67 IN | SYSTOLIC BLOOD PRESSURE: 152 MMHG | HEART RATE: 87 BPM | BODY MASS INDEX: 29.66 KG/M2 | WEIGHT: 189 LBS | OXYGEN SATURATION: 98 % | TEMPERATURE: 98.3 F | DIASTOLIC BLOOD PRESSURE: 84 MMHG

## 2018-01-17 DIAGNOSIS — I10 ESSENTIAL (PRIMARY) HYPERTENSION: ICD-10-CM

## 2018-01-17 DIAGNOSIS — Z86.718 PERSONAL HISTORY OF OTHER VENOUS THROMBOSIS AND EMBOLISM: ICD-10-CM

## 2018-01-17 DIAGNOSIS — I45.10 UNSPECIFIED RIGHT BUNDLE-BRANCH BLOCK: ICD-10-CM

## 2018-01-17 DIAGNOSIS — Z88.0 ALLERGY STATUS TO PENICILLIN: ICD-10-CM

## 2018-01-17 DIAGNOSIS — E11.9 TYPE 2 DIABETES MELLITUS WITHOUT COMPLICATIONS: ICD-10-CM

## 2018-01-17 DIAGNOSIS — Z85.528 PERSONAL HISTORY OF OTHER MALIGNANT NEOPLASM OF KIDNEY: ICD-10-CM

## 2018-01-17 DIAGNOSIS — N17.9 ACUTE KIDNEY FAILURE, UNSPECIFIED: ICD-10-CM

## 2018-01-17 DIAGNOSIS — R50.9 FEVER, UNSPECIFIED: ICD-10-CM

## 2018-01-17 DIAGNOSIS — N13.2 HYDRONEPHROSIS WITH RENAL AND URETERAL CALCULOUS OBSTRUCTION: ICD-10-CM

## 2018-01-17 PROCEDURE — 99214 OFFICE O/P EST MOD 30 MIN: CPT | Mod: 57

## 2018-01-18 ENCOUNTER — APPOINTMENT (OUTPATIENT)
Dept: HEART AND VASCULAR | Facility: CLINIC | Age: 68
End: 2018-01-18
Payer: MEDICARE

## 2018-01-18 VITALS
HEIGHT: 67 IN | HEART RATE: 79 BPM | OXYGEN SATURATION: 99 % | DIASTOLIC BLOOD PRESSURE: 76 MMHG | SYSTOLIC BLOOD PRESSURE: 122 MMHG | WEIGHT: 189 LBS | BODY MASS INDEX: 29.66 KG/M2 | TEMPERATURE: 97.8 F

## 2018-01-18 DIAGNOSIS — R94.31 ABNORMAL ELECTROCARDIOGRAM [ECG] [EKG]: ICD-10-CM

## 2018-01-18 DIAGNOSIS — Z01.818 ENCOUNTER FOR OTHER PREPROCEDURAL EXAMINATION: ICD-10-CM

## 2018-01-18 PROCEDURE — 93000 ELECTROCARDIOGRAM COMPLETE: CPT

## 2018-01-18 PROCEDURE — 93306 TTE W/DOPPLER COMPLETE: CPT | Mod: XE

## 2018-01-18 PROCEDURE — 99204 OFFICE O/P NEW MOD 45 MIN: CPT | Mod: 25

## 2018-01-22 LAB
ALBUMIN SERPL ELPH-MCNC: 4.4 G/DL
ALP BLD-CCNC: 130 U/L
ALT SERPL-CCNC: 17 U/L
ANION GAP SERPL CALC-SCNC: 14 MMOL/L
APPEARANCE: ABNORMAL
APTT BLD: 43.3 SEC
AST SERPL-CCNC: 18 U/L
BACTERIA UR CULT: NORMAL
BACTERIA: NEGATIVE
BASOPHILS # BLD AUTO: 0.06 K/UL
BASOPHILS NFR BLD AUTO: 0.5 %
BILIRUB SERPL-MCNC: 0.2 MG/DL
BILIRUBIN URINE: NEGATIVE
BLOOD URINE: ABNORMAL
BUN SERPL-MCNC: 22 MG/DL
CALCIUM SERPL-MCNC: 9.7 MG/DL
CHLORIDE SERPL-SCNC: 104 MMOL/L
CO2 SERPL-SCNC: 23 MMOL/L
COLOR: YELLOW
CREAT SERPL-MCNC: 1.45 MG/DL
EOSINOPHIL # BLD AUTO: 0.17 K/UL
EOSINOPHIL NFR BLD AUTO: 1.5 %
GLUCOSE QUALITATIVE U: NEGATIVE MG/DL
GLUCOSE SERPL-MCNC: 131 MG/DL
HCT VFR BLD CALC: 45.8 %
HGB BLD-MCNC: 14.3 G/DL
HYALINE CASTS: 0 /LPF
IMM GRANULOCYTES NFR BLD AUTO: 0.5 %
INR PPP: 2.21 RATIO
KETONES URINE: NEGATIVE
LEUKOCYTE ESTERASE URINE: ABNORMAL
LYMPHOCYTES # BLD AUTO: 2.88 K/UL
LYMPHOCYTES NFR BLD AUTO: 25.7 %
MAN DIFF?: NORMAL
MCHC RBC-ENTMCNC: 29.6 PG
MCHC RBC-ENTMCNC: 31.2 GM/DL
MCV RBC AUTO: 94.8 FL
MICROSCOPIC-UA: NORMAL
MONOCYTES # BLD AUTO: 0.83 K/UL
MONOCYTES NFR BLD AUTO: 7.4 %
NEUTROPHILS # BLD AUTO: 7.19 K/UL
NEUTROPHILS NFR BLD AUTO: 64.4 %
NITRITE URINE: NEGATIVE
PH URINE: 5
PLATELET # BLD AUTO: 345 K/UL
POTASSIUM SERPL-SCNC: 5.2 MMOL/L
PROT SERPL-MCNC: 7.4 G/DL
PROTEIN URINE: 100 MG/DL
PT BLD: 25.4 SEC
RBC # BLD: 4.83 M/UL
RBC # FLD: 13.8 %
RED BLOOD CELLS URINE: 270 /HPF
SODIUM SERPL-SCNC: 141 MMOL/L
SPECIFIC GRAVITY URINE: 1.02
SQUAMOUS EPITHELIAL CELLS: 2 /HPF
UROBILINOGEN URINE: NEGATIVE MG/DL
WBC # FLD AUTO: 11.19 K/UL
WHITE BLOOD CELLS URINE: 40 /HPF

## 2018-02-14 RX ORDER — INSULIN DETEMIR 100/ML (3)
48 INSULIN PEN (ML) SUBCUTANEOUS
Qty: 0 | Refills: 0 | COMMUNITY

## 2018-02-14 RX ORDER — RIVAROXABAN 15 MG-20MG
1 KIT ORAL
Qty: 0 | Refills: 0 | COMMUNITY

## 2018-02-14 RX ORDER — METFORMIN HYDROCHLORIDE 850 MG/1
0 TABLET ORAL
Qty: 0 | Refills: 0 | COMMUNITY

## 2018-02-14 RX ORDER — METOPROLOL TARTRATE 50 MG
0 TABLET ORAL
Qty: 0 | Refills: 0 | COMMUNITY

## 2018-02-15 ENCOUNTER — OUTPATIENT (OUTPATIENT)
Dept: OUTPATIENT SERVICES | Facility: HOSPITAL | Age: 68
LOS: 1 days | Discharge: HOME | End: 2018-02-15
Payer: MEDICARE

## 2018-02-15 ENCOUNTER — RESULT REVIEW (OUTPATIENT)
Age: 68
End: 2018-02-15

## 2018-02-15 DIAGNOSIS — Z41.9 ENCOUNTER FOR PROCEDURE FOR PURPOSES OTHER THAN REMEDYING HEALTH STATE, UNSPECIFIED: Chronic | ICD-10-CM

## 2018-02-15 DIAGNOSIS — Z98.890 OTHER SPECIFIED POSTPROCEDURAL STATES: Chronic | ICD-10-CM

## 2018-02-15 DIAGNOSIS — H26.9 UNSPECIFIED CATARACT: Chronic | ICD-10-CM

## 2018-02-15 LAB
GLUCOSE BLDC GLUCOMTR-MCNC: 76 MG/DL — SIGNIFICANT CHANGE UP (ref 70–99)
GLUCOSE BLDC GLUCOMTR-MCNC: 90 MG/DL — SIGNIFICANT CHANGE UP (ref 70–99)

## 2018-02-15 PROCEDURE — 74420 UROGRAPHY RTRGR +-KUB: CPT | Mod: 26

## 2018-02-15 PROCEDURE — 52356 CYSTO/URETERO W/LITHOTRIPSY: CPT | Mod: LT

## 2018-02-15 PROCEDURE — 52352 CYSTOURETERO W/STONE REMOVE: CPT | Mod: 59,LT

## 2018-02-15 RX ORDER — TAMSULOSIN HYDROCHLORIDE 0.4 MG/1
1 CAPSULE ORAL
Qty: 30 | Refills: 0 | OUTPATIENT
Start: 2018-02-15 | End: 2018-03-16

## 2018-02-15 RX ORDER — DOCUSATE SODIUM 100 MG
1 CAPSULE ORAL
Qty: 45 | Refills: 0 | OUTPATIENT
Start: 2018-02-15

## 2018-02-15 RX ORDER — OXYBUTYNIN CHLORIDE 5 MG
1 TABLET ORAL
Qty: 15 | Refills: 0 | OUTPATIENT
Start: 2018-02-15 | End: 2018-02-19

## 2018-02-15 RX ORDER — PHENAZOPYRIDINE HCL 100 MG
1 TABLET ORAL
Qty: 9 | Refills: 0 | OUTPATIENT
Start: 2018-02-15 | End: 2018-02-17

## 2018-02-15 RX ORDER — OXYCODONE HYDROCHLORIDE 5 MG/1
1 TABLET ORAL
Qty: 14 | Refills: 0 | OUTPATIENT
Start: 2018-02-15

## 2018-02-19 LAB — SURGICAL PATHOLOGY STUDY: SIGNIFICANT CHANGE UP

## 2018-02-20 ENCOUNTER — FORM ENCOUNTER (OUTPATIENT)
Age: 68
End: 2018-02-20

## 2018-02-21 ENCOUNTER — APPOINTMENT (OUTPATIENT)
Dept: VASCULAR SURGERY | Facility: CLINIC | Age: 68
End: 2018-02-21
Payer: MEDICARE

## 2018-02-21 ENCOUNTER — OUTPATIENT (OUTPATIENT)
Dept: OUTPATIENT SERVICES | Facility: HOSPITAL | Age: 68
LOS: 1 days | End: 2018-02-21

## 2018-02-21 ENCOUNTER — APPOINTMENT (OUTPATIENT)
Dept: RADIOLOGY | Facility: CLINIC | Age: 68
End: 2018-02-21
Payer: MEDICARE

## 2018-02-21 ENCOUNTER — APPOINTMENT (OUTPATIENT)
Dept: ENDOCRINOLOGY | Facility: CLINIC | Age: 68
End: 2018-02-21
Payer: MEDICARE

## 2018-02-21 VITALS
HEART RATE: 69 BPM | WEIGHT: 189 LBS | BODY MASS INDEX: 29.66 KG/M2 | DIASTOLIC BLOOD PRESSURE: 83 MMHG | HEIGHT: 67 IN | SYSTOLIC BLOOD PRESSURE: 147 MMHG

## 2018-02-21 DIAGNOSIS — Z98.890 OTHER SPECIFIED POSTPROCEDURAL STATES: Chronic | ICD-10-CM

## 2018-02-21 DIAGNOSIS — H26.9 UNSPECIFIED CATARACT: Chronic | ICD-10-CM

## 2018-02-21 DIAGNOSIS — Z41.9 ENCOUNTER FOR PROCEDURE FOR PURPOSES OTHER THAN REMEDYING HEALTH STATE, UNSPECIFIED: Chronic | ICD-10-CM

## 2018-02-21 PROCEDURE — 93923 UPR/LXTR ART STDY 3+ LVLS: CPT

## 2018-02-21 PROCEDURE — 99204 OFFICE O/P NEW MOD 45 MIN: CPT | Mod: 25,GC

## 2018-02-21 PROCEDURE — 77080 DXA BONE DENSITY AXIAL: CPT | Mod: 26

## 2018-02-21 PROCEDURE — 36415 COLL VENOUS BLD VENIPUNCTURE: CPT

## 2018-02-22 LAB
25(OH)D3 SERPL-MCNC: 25.4 NG/ML
ALBUMIN SERPL ELPH-MCNC: 4 G/DL
ALP BLD-CCNC: 83 U/L
ALT SERPL-CCNC: 13 U/L
ANION GAP SERPL CALC-SCNC: 21 MMOL/L
AST SERPL-CCNC: 20 U/L
BASOPHILS # BLD AUTO: 0.04 K/UL
BASOPHILS NFR BLD AUTO: 0.4 %
BILIRUB SERPL-MCNC: 0.4 MG/DL
BUN SERPL-MCNC: 22 MG/DL
CALCIUM SERPL-MCNC: 10.1 MG/DL
CHLORIDE SERPL-SCNC: 99 MMOL/L
CHOLEST SERPL-MCNC: 167 MG/DL
CHOLEST/HDLC SERPL: 4.5 RATIO
CO2 SERPL-SCNC: 20 MMOL/L
COMPN STONE: SIGNIFICANT CHANGE UP
CREAT SERPL-MCNC: 1.49 MG/DL
CREAT SPEC-SCNC: 120 MG/DL
EOSINOPHIL # BLD AUTO: 0.21 K/UL
EOSINOPHIL NFR BLD AUTO: 2.2 %
GLUCOSE SERPL-MCNC: 85 MG/DL
HBA1C MFR BLD HPLC: 7.4 %
HCT VFR BLD CALC: 46.5 %
HDLC SERPL-MCNC: 37 MG/DL
HGB BLD-MCNC: 14.2 G/DL
IMM GRANULOCYTES NFR BLD AUTO: 0.7 %
LDLC SERPL CALC-MCNC: 106 MG/DL
LYMPHOCYTES # BLD AUTO: 2.27 K/UL
LYMPHOCYTES NFR BLD AUTO: 23.5 %
MAN DIFF?: NORMAL
MCHC RBC-ENTMCNC: 30 PG
MCHC RBC-ENTMCNC: 30.5 GM/DL
MCV RBC AUTO: 98.3 FL
MICROALBUMIN 24H UR DL<=1MG/L-MCNC: 280.8 MG/DL
MICROALBUMIN/CREAT 24H UR-RTO: 2340 MG/G
MONOCYTES # BLD AUTO: 0.91 K/UL
MONOCYTES NFR BLD AUTO: 9.4 %
NEUTROPHILS # BLD AUTO: 6.16 K/UL
NEUTROPHILS NFR BLD AUTO: 63.8 %
PLATELET # BLD AUTO: 288 K/UL
POTASSIUM SERPL-SCNC: 4.8 MMOL/L
PROT SERPL-MCNC: 7.5 G/DL
RBC # BLD: 4.73 M/UL
RBC # FLD: 14 %
SODIUM SERPL-SCNC: 140 MMOL/L
TRIGL SERPL-MCNC: 121 MG/DL
TSH SERPL-ACNC: 1.93 UIU/ML
WBC # FLD AUTO: 9.66 K/UL

## 2018-02-22 RX ORDER — MULTIVIT-MIN/FOLIC/VIT K/LYCOP 400-300MCG
25 MCG TABLET ORAL DAILY
Qty: 30 | Refills: 5 | Status: ACTIVE | COMMUNITY
Start: 2018-02-22 | End: 1900-01-01

## 2018-02-22 RX ORDER — ATORVASTATIN CALCIUM 40 MG/1
40 TABLET, FILM COATED ORAL DAILY
Qty: 30 | Refills: 3 | Status: ACTIVE | COMMUNITY
Start: 2018-02-22 | End: 1900-01-01

## 2018-02-27 ENCOUNTER — APPOINTMENT (OUTPATIENT)
Dept: INFUSION THERAPY | Facility: HOSPITAL | Age: 68
End: 2018-02-27

## 2018-02-28 ENCOUNTER — APPOINTMENT (OUTPATIENT)
Dept: UROLOGY | Facility: CLINIC | Age: 68
End: 2018-02-28
Payer: MEDICARE

## 2018-02-28 VITALS
BODY MASS INDEX: 29.66 KG/M2 | WEIGHT: 189 LBS | TEMPERATURE: 97.7 F | HEIGHT: 67 IN | HEART RATE: 80 BPM | SYSTOLIC BLOOD PRESSURE: 129 MMHG | DIASTOLIC BLOOD PRESSURE: 81 MMHG | OXYGEN SATURATION: 97 %

## 2018-02-28 DIAGNOSIS — N20.1 CALCULUS OF URETER: ICD-10-CM

## 2018-02-28 PROCEDURE — 52310 CYSTOSCOPY AND TREATMENT: CPT

## 2018-03-09 RX ORDER — ZOLEDRONIC ACID 5 MG/100ML
5 INJECTION, SOLUTION INTRAVENOUS ONCE
Qty: 0 | Refills: 0 | Status: DISCONTINUED | OUTPATIENT
Start: 2018-03-12 | End: 2018-03-27

## 2018-03-12 ENCOUNTER — OUTPATIENT (OUTPATIENT)
Dept: OUTPATIENT SERVICES | Facility: HOSPITAL | Age: 68
LOS: 1 days | End: 2018-03-12
Payer: MEDICARE

## 2018-03-12 ENCOUNTER — APPOINTMENT (OUTPATIENT)
Dept: INFUSION THERAPY | Facility: HOSPITAL | Age: 68
End: 2018-03-12

## 2018-03-12 DIAGNOSIS — M81.0 AGE-RELATED OSTEOPOROSIS WITHOUT CURRENT PATHOLOGICAL FRACTURE: ICD-10-CM

## 2018-03-12 DIAGNOSIS — Z98.890 OTHER SPECIFIED POSTPROCEDURAL STATES: Chronic | ICD-10-CM

## 2018-03-12 DIAGNOSIS — Z41.9 ENCOUNTER FOR PROCEDURE FOR PURPOSES OTHER THAN REMEDYING HEALTH STATE, UNSPECIFIED: Chronic | ICD-10-CM

## 2018-03-12 DIAGNOSIS — H26.9 UNSPECIFIED CATARACT: Chronic | ICD-10-CM

## 2018-03-12 PROCEDURE — 96365 THER/PROPH/DIAG IV INF INIT: CPT

## 2018-03-21 ENCOUNTER — MOBILE ON CALL (OUTPATIENT)
Age: 68
End: 2018-03-21

## 2018-03-22 ENCOUNTER — CLINICAL ADVICE (OUTPATIENT)
Age: 68
End: 2018-03-22

## 2018-04-11 ENCOUNTER — LABORATORY RESULT (OUTPATIENT)
Age: 68
End: 2018-04-11

## 2018-04-11 ENCOUNTER — APPOINTMENT (OUTPATIENT)
Dept: ENDOCRINOLOGY | Facility: CLINIC | Age: 68
End: 2018-04-11
Payer: MEDICARE

## 2018-04-11 VITALS
HEIGHT: 67 IN | WEIGHT: 181 LBS | DIASTOLIC BLOOD PRESSURE: 76 MMHG | BODY MASS INDEX: 28.41 KG/M2 | SYSTOLIC BLOOD PRESSURE: 126 MMHG | HEART RATE: 83 BPM

## 2018-04-11 PROCEDURE — 99213 OFFICE O/P EST LOW 20 MIN: CPT | Mod: 25,GC

## 2018-04-11 PROCEDURE — 36415 COLL VENOUS BLD VENIPUNCTURE: CPT

## 2018-04-11 RX ORDER — METFORMIN HYDROCHLORIDE 1000 MG/1
1000 TABLET, COATED ORAL
Qty: 60 | Refills: 3 | Status: ACTIVE | COMMUNITY
Start: 1900-01-01 | End: 1900-01-01

## 2018-04-11 RX ORDER — TAMSULOSIN HYDROCHLORIDE 0.4 MG/1
CAPSULE ORAL
Refills: 0 | Status: DISCONTINUED | COMMUNITY
End: 2018-04-11

## 2018-04-12 LAB
25(OH)D3 SERPL-MCNC: 32.7 NG/ML
ALBUMIN SERPL ELPH-MCNC: 4.2 G/DL
ALP BLD-CCNC: 114 U/L
ALT SERPL-CCNC: 12 U/L
ANION GAP SERPL CALC-SCNC: 18 MMOL/L
AST SERPL-CCNC: 16 U/L
BASOPHILS # BLD AUTO: 0.04 K/UL
BASOPHILS NFR BLD AUTO: 0.4 %
BILIRUB SERPL-MCNC: 0.4 MG/DL
BUN SERPL-MCNC: 17 MG/DL
CALCIUM SERPL-MCNC: 9.6 MG/DL
CHLORIDE SERPL-SCNC: 102 MMOL/L
CO2 SERPL-SCNC: 22 MMOL/L
CREAT SERPL-MCNC: 1.53 MG/DL
CREAT SPEC-SCNC: 99 MG/DL
EOSINOPHIL # BLD AUTO: 0.07 K/UL
EOSINOPHIL NFR BLD AUTO: 0.6 %
GLUCOSE SERPL-MCNC: 76 MG/DL
HBA1C MFR BLD HPLC: 6.2 %
HCT VFR BLD CALC: 46.5 %
HGB BLD-MCNC: 14.7 G/DL
IMM GRANULOCYTES NFR BLD AUTO: 0.5 %
LYMPHOCYTES # BLD AUTO: 2.8 K/UL
LYMPHOCYTES NFR BLD AUTO: 25.8 %
MAN DIFF?: NORMAL
MCHC RBC-ENTMCNC: 30.5 PG
MCHC RBC-ENTMCNC: 31.6 GM/DL
MCV RBC AUTO: 96.5 FL
MICROALBUMIN 24H UR DL<=1MG/L-MCNC: 0.8 MG/DL
MICROALBUMIN/CREAT 24H UR-RTO: 8 MG/G
MONOCYTES # BLD AUTO: 0.89 K/UL
MONOCYTES NFR BLD AUTO: 8.2 %
NEUTROPHILS # BLD AUTO: 7.02 K/UL
NEUTROPHILS NFR BLD AUTO: 64.5 %
PLATELET # BLD AUTO: 292 K/UL
POTASSIUM SERPL-SCNC: 5.1 MMOL/L
PROT SERPL-MCNC: 7.3 G/DL
RBC # BLD: 4.82 M/UL
RBC # FLD: 14.9 %
SODIUM SERPL-SCNC: 142 MMOL/L
WBC # FLD AUTO: 10.87 K/UL

## 2018-04-13 LAB — VIT B12 SERPL-MCNC: 231 PG/ML

## 2018-06-28 ENCOUNTER — FORM ENCOUNTER (OUTPATIENT)
Age: 68
End: 2018-06-28

## 2018-06-29 ENCOUNTER — OUTPATIENT (OUTPATIENT)
Dept: OUTPATIENT SERVICES | Facility: HOSPITAL | Age: 68
LOS: 1 days | End: 2018-06-29
Payer: MEDICARE

## 2018-06-29 ENCOUNTER — APPOINTMENT (OUTPATIENT)
Dept: ULTRASOUND IMAGING | Facility: HOSPITAL | Age: 68
End: 2018-06-29

## 2018-06-29 ENCOUNTER — EMERGENCY (EMERGENCY)
Facility: HOSPITAL | Age: 68
LOS: 1 days | Discharge: HOME | End: 2018-06-29
Attending: EMERGENCY MEDICINE | Admitting: EMERGENCY MEDICINE
Payer: MEDICARE

## 2018-06-29 ENCOUNTER — APPOINTMENT (OUTPATIENT)
Dept: ENDOCRINOLOGY | Facility: CLINIC | Age: 68
End: 2018-06-29
Payer: MEDICARE

## 2018-06-29 VITALS
DIASTOLIC BLOOD PRESSURE: 87 MMHG | RESPIRATION RATE: 18 BRPM | HEART RATE: 92 BPM | OXYGEN SATURATION: 99 % | SYSTOLIC BLOOD PRESSURE: 158 MMHG | TEMPERATURE: 98 F

## 2018-06-29 VITALS
SYSTOLIC BLOOD PRESSURE: 152 MMHG | WEIGHT: 174 LBS | DIASTOLIC BLOOD PRESSURE: 72 MMHG | BODY MASS INDEX: 26.37 KG/M2 | HEART RATE: 61 BPM | HEIGHT: 68 IN

## 2018-06-29 DIAGNOSIS — E11.9 TYPE 2 DIABETES MELLITUS WITHOUT COMPLICATIONS: ICD-10-CM

## 2018-06-29 DIAGNOSIS — Z98.890 OTHER SPECIFIED POSTPROCEDURAL STATES: Chronic | ICD-10-CM

## 2018-06-29 DIAGNOSIS — H26.9 UNSPECIFIED CATARACT: Chronic | ICD-10-CM

## 2018-06-29 DIAGNOSIS — I10 ESSENTIAL (PRIMARY) HYPERTENSION: ICD-10-CM

## 2018-06-29 DIAGNOSIS — Z88.0 ALLERGY STATUS TO PENICILLIN: ICD-10-CM

## 2018-06-29 DIAGNOSIS — Z79.4 LONG TERM (CURRENT) USE OF INSULIN: ICD-10-CM

## 2018-06-29 DIAGNOSIS — E55.9 VITAMIN D DEFICIENCY, UNSPECIFIED: ICD-10-CM

## 2018-06-29 DIAGNOSIS — Z41.9 ENCOUNTER FOR PROCEDURE FOR PURPOSES OTHER THAN REMEDYING HEALTH STATE, UNSPECIFIED: Chronic | ICD-10-CM

## 2018-06-29 DIAGNOSIS — Z79.891 LONG TERM (CURRENT) USE OF OPIATE ANALGESIC: ICD-10-CM

## 2018-06-29 DIAGNOSIS — Z79.899 OTHER LONG TERM (CURRENT) DRUG THERAPY: ICD-10-CM

## 2018-06-29 DIAGNOSIS — E66.9 OBESITY, UNSPECIFIED: ICD-10-CM

## 2018-06-29 DIAGNOSIS — E11.9 TYPE 2 DIABETES MELLITUS W/OUT COMPLICATIONS: ICD-10-CM

## 2018-06-29 DIAGNOSIS — M79.662 PAIN IN LEFT LOWER LEG: ICD-10-CM

## 2018-06-29 DIAGNOSIS — I82.402 ACUTE EMBOLISM AND THROMBOSIS OF UNSPECIFIED DEEP VEINS OF LEFT LOWER EXTREMITY: ICD-10-CM

## 2018-06-29 LAB
ALBUMIN SERPL ELPH-MCNC: 3.9 G/DL — SIGNIFICANT CHANGE UP (ref 3.3–5)
ALP SERPL-CCNC: 99 U/L — SIGNIFICANT CHANGE UP (ref 40–120)
ALT FLD-CCNC: 69 U/L — HIGH (ref 10–45)
ANION GAP SERPL CALC-SCNC: 16 MMOL/L — SIGNIFICANT CHANGE UP (ref 5–17)
APTT BLD: 29 SEC — SIGNIFICANT CHANGE UP (ref 27.5–37.4)
AST SERPL-CCNC: 42 U/L — HIGH (ref 10–40)
BASOPHILS NFR BLD AUTO: 0.4 % — SIGNIFICANT CHANGE UP (ref 0–2)
BILIRUB SERPL-MCNC: 0.5 MG/DL — SIGNIFICANT CHANGE UP (ref 0.2–1.2)
BUN SERPL-MCNC: 22 MG/DL — SIGNIFICANT CHANGE UP (ref 7–23)
CALCIUM SERPL-MCNC: 8.9 MG/DL — SIGNIFICANT CHANGE UP (ref 8.4–10.5)
CHLORIDE SERPL-SCNC: 101 MMOL/L — SIGNIFICANT CHANGE UP (ref 96–108)
CO2 SERPL-SCNC: 22 MMOL/L — SIGNIFICANT CHANGE UP (ref 22–31)
CREAT SERPL-MCNC: 1.22 MG/DL — SIGNIFICANT CHANGE UP (ref 0.5–1.3)
EOSINOPHIL NFR BLD AUTO: 1.9 % — SIGNIFICANT CHANGE UP (ref 0–6)
GLUCOSE SERPL-MCNC: 101 MG/DL — HIGH (ref 70–99)
HCT VFR BLD CALC: 41.5 % — SIGNIFICANT CHANGE UP (ref 39–50)
HGB BLD-MCNC: 13.2 G/DL — SIGNIFICANT CHANGE UP (ref 13–17)
INR BLD: 1.11 — SIGNIFICANT CHANGE UP (ref 0.88–1.16)
LYMPHOCYTES # BLD AUTO: 27.6 % — SIGNIFICANT CHANGE UP (ref 13–44)
MCHC RBC-ENTMCNC: 29.9 PG — SIGNIFICANT CHANGE UP (ref 27–34)
MCHC RBC-ENTMCNC: 31.8 G/DL — LOW (ref 32–36)
MCV RBC AUTO: 93.9 FL — SIGNIFICANT CHANGE UP (ref 80–100)
MONOCYTES NFR BLD AUTO: 9.3 % — SIGNIFICANT CHANGE UP (ref 2–14)
NEUTROPHILS NFR BLD AUTO: 60.8 % — SIGNIFICANT CHANGE UP (ref 43–77)
PLATELET # BLD AUTO: 282 K/UL — SIGNIFICANT CHANGE UP (ref 150–400)
POTASSIUM SERPL-MCNC: 4.2 MMOL/L — SIGNIFICANT CHANGE UP (ref 3.5–5.3)
POTASSIUM SERPL-SCNC: 4.2 MMOL/L — SIGNIFICANT CHANGE UP (ref 3.5–5.3)
PROT SERPL-MCNC: 6.8 G/DL — SIGNIFICANT CHANGE UP (ref 6–8.3)
PROTHROM AB SERPL-ACNC: 12.3 SEC — SIGNIFICANT CHANGE UP (ref 9.8–12.7)
RBC # BLD: 4.42 M/UL — SIGNIFICANT CHANGE UP (ref 4.2–5.8)
RBC # FLD: 14.2 % — SIGNIFICANT CHANGE UP (ref 10.3–16.9)
SODIUM SERPL-SCNC: 139 MMOL/L — SIGNIFICANT CHANGE UP (ref 135–145)
WBC # BLD: 11.2 K/UL — HIGH (ref 3.8–10.5)
WBC # FLD AUTO: 11.2 K/UL — HIGH (ref 3.8–10.5)

## 2018-06-29 PROCEDURE — 99283 EMERGENCY DEPT VISIT LOW MDM: CPT

## 2018-06-29 PROCEDURE — 99213 OFFICE O/P EST LOW 20 MIN: CPT | Mod: GC

## 2018-06-29 PROCEDURE — 85730 THROMBOPLASTIN TIME PARTIAL: CPT

## 2018-06-29 PROCEDURE — 36415 COLL VENOUS BLD VENIPUNCTURE: CPT

## 2018-06-29 PROCEDURE — 93970 EXTREMITY STUDY: CPT | Mod: 26

## 2018-06-29 PROCEDURE — 85610 PROTHROMBIN TIME: CPT

## 2018-06-29 PROCEDURE — 93970 EXTREMITY STUDY: CPT

## 2018-06-29 PROCEDURE — 99284 EMERGENCY DEPT VISIT MOD MDM: CPT

## 2018-06-29 PROCEDURE — 80053 COMPREHEN METABOLIC PANEL: CPT

## 2018-06-29 PROCEDURE — 85025 COMPLETE CBC W/AUTO DIFF WBC: CPT

## 2018-06-29 RX ORDER — RIVAROXABAN 15 MG-20MG
1 KIT ORAL
Qty: 42 | Refills: 0 | OUTPATIENT
Start: 2018-06-29 | End: 2018-07-19

## 2018-06-29 RX ORDER — RIVAROXABAN 15 MG-20MG
15 KIT ORAL ONCE
Qty: 0 | Refills: 0 | Status: COMPLETED | OUTPATIENT
Start: 2018-06-29 | End: 2018-06-29

## 2018-06-29 RX ADMIN — RIVAROXABAN 15 MILLIGRAM(S): KIT at 23:09

## 2018-06-29 NOTE — ED ADULT NURSE NOTE - CHPI ED SYMPTOMS NEG
no fever/no cough/no chest pain/no back pain/no shortness of breath/no syncope/no diaphoresis/no chills/no dizziness/no vomiting

## 2018-06-29 NOTE — CONSULT NOTE ADULT - ATTENDING COMMENTS
Agree with above.    Patient should continue anticoagulation.    Likely will need lifelong anticoagulation.    F/u in my office in 2 weeks.

## 2018-06-29 NOTE — CONSULT NOTE ADULT - SUBJECTIVE AND OBJECTIVE BOX
HPI: Patient is a 67 yo M with h/o RCC ~14 yrs ago s/p partial nephrectomy,  arrhythmia (s/p ablation),  HTN, LLE DVT and PE presented for evaluation of the LLE DVT.     He was diagnosed with distal femoral DVT and PE in Nov 2017. He completed 6 mo of anticoagulation with Xarelto and was recently stopped. He saw his PCP/ endocrinologist in the office, who referred him for an US, demonstrating acute on chronic L FV DVT.   He has no symptoms of LE Edema, pain or tenderness.     Patient reports sedentary lifestyle. Non-smoker, no FH of hypercoagulable states or DVTS.        PAST MEDICAL & SURGICAL HISTORY:  Tremor of unknown origin  RBBB  DM (diabetes mellitus)  Tachycardia, unspecified: s/p ablation 2012  Renal cancer, right: s/p R partial nephrectomy 2008  DVT of lower extremity (deep venous thrombosis): LLE OCT/2017  HTN (hypertension)  Renal stones  History of liver biopsy  H/O atrioventricular good ablation: for arrhythmia  Cataracts, both eyes: 2 separate surgeries  H/O partial nephrectomy: right  Elective surgery: left kidney stent  H/O lithotripsy: several since 1980&#x27;s      REVIEW OF SYSTEMS      General:	No fevers, chills     Skin/Breast: think skin   	  Ophthalmologic:none   	  ENMT:	    Respiratory and Thorax: no SOB, No chest pain.   	  Cardiovascular:	No SOB or chest pain     Gastrointestinal:	neg. Lost 20 LB since started on insulin and better DM control     Genitourinary:	Neg    Musculoskeletal:	 arthralgias     Neurological:	intact      Hematology/Lymphatics:	 no hypercoagulable work-up. as per HPI     Endocrine:	Neg    Allergic/Immunologic:	neg    Allergies: penicillins (Unknown)    Intolerances        SOCIAL HISTORY: Non-smoker.     FAMILY HISTORY: No FH of DVT, PE or bleeding disorders       Vital Signs Last 24 Hrs  T(C): 36.8 (29 Jun 2018 20:23), Max: 36.8 (29 Jun 2018 20:23)  T(F): 98.2 (29 Jun 2018 20:23), Max: 98.2 (29 Jun 2018 20:23)  HR: 92 (29 Jun 2018 20:23) (92 - 92)  BP: 158/87 (29 Jun 2018 20:23) (158/87 - 158/87)  BP(mean): --  RR: 18 (29 Jun 2018 20:23) (18 - 18)  SpO2: 99% (29 Jun 2018 20:23) (99% - 99%)    PHYSICAL EXAM:      Constitutional: AAox3, NAD        ENMT: NCAT     Neck: Supple. No JVD     Back: WNL     Respiratory: CTAB     Cardiovascular: RRR    Gastrointestinal: obese, soft, NT, ND     Genitourinary: No CVA tenderness       Extremities: No edena. no cyanosis     Vascular: No LE cyanosis or edema. No varicose veins. Palpable femoral and pop pulses. DP/PT signals bilaterally     Neurological: AAOx3, CN II-XII intact. Strength and sensation intact     Skin: no ulcers     Musculoskeletal: decreased muscle bulk and tone.     Psychiatric: calm         LABS:                        13.2   11.2  )-----------( 282      ( 29 Jun 2018 21:45 )             41.5     06-29    139  |  101  |  22  ----------------------------<  101<H>  4.2   |  22  |  1.22    Ca    8.9      29 Jun 2018 21:45    TPro  6.8  /  Alb  3.9  /  TBili  0.5  /  DBili  x   /  AST  42<H>  /  ALT  69<H>  /  AlkPhos  99  06-29    PT/INR - ( 29 Jun 2018 21:45 )   PT: 12.3 sec;   INR: 1.11          PTT - ( 29 Jun 2018 21:45 )  PTT:29.0 sec      RADIOLOGY & ADDITIONAL STUDIES:

## 2018-06-29 NOTE — ED PROVIDER NOTE - MEDICAL DECISION MAKING DETAILS
dvt noted, pulses intact, pt w/ hx of dvt, previously on xarelto, though hx of renal CA, now unprovoked dvt, will d/w vascular team regarding US findings (occlusive mid fem)

## 2018-06-29 NOTE — ED ADULT NURSE NOTE - OBJECTIVE STATEMENT
67 y/o male sent from US for positive finding of DVT on left leg. Pt. reports DVT 9 months ago, was on blood thinners, discontinued taking blood thinners. Wasn't clear on why stopped blood thinners. Went to change PMD and new US study finds abnormality. Pt denies CP, SOB, dizziness or weakness. pt. has cold extremities (hands), but reports he is Diabetic and had not eaten since 2pm. Pt appears pale. Pt. recently had protein bar while in ED. Pt speaks clear, MAEx4, has unlabored breathing. Abd soft nt nd. Skin dry and cool.

## 2018-06-29 NOTE — ED PROVIDER NOTE - PHYSICAL EXAMINATION
CON: ao x 3, HENMT: clear oropharynx, soft neck, HEAD: atraumatic, CV: rrr, equal pulses b/l, RESP: cta b/l, GI: +BS, soft, nontender, no rebound, no guarding, SKIN: no rash, MSK: LLE swelling below knee, pulses palpable b/l and equal, cap refill <2 sec b/l, sensation intact, soft compartment, NEURO: no gross motor or sensory deficit

## 2018-06-29 NOTE — ED PROVIDER NOTE - PROGRESS NOTE DETAILS
unprovoked dvt x 2, hx of CA w/ nephrectomy but no follow up eval, d/w vascular regarding findings and clinical context, ?xarelto vs lovenox/warfin vs any surgical intervention? per pt's daughter, feb US showed improvement, rather than complete resolution, given pt w/ improvement of sx, will restart xalreto, and follow up with PMD instructed for further workup

## 2018-06-29 NOTE — ED PROVIDER NOTE - OBJECTIVE STATEMENT
68 yom pw DVT on US to LLE.  states sent in by PMD for follow up, denies any pain or swelling.  hx of DVT in Oct 2017, placed on xarelto for 6 mo, had repeat US in Feb 18 and was told neg.  pt in transition of care from [facility] to [facility].  no cp, no sob.  hx of renal tumor in 2005 s/ partial nephrectomy, but no additional studies for cancer eval/surveillance, no hx of other known CA.  +weight loss but states on a new DM med.  no night sweats.  no travel/trauma/immobilization.  (pt states in 2017 had a 2 hr flight, but otherwise no preceding event).  no abd pain, no pelvic pain or swelling

## 2018-06-29 NOTE — ED ADULT NURSE NOTE - PSH
Cataracts, both eyes  2 separate surgeries  Elective surgery  left kidney stent  H/O atrioventricular good ablation  for arrhythmia  H/O lithotripsy  several since 1980's  H/O partial nephrectomy  right  History of liver biopsy

## 2018-06-29 NOTE — CONSULT NOTE ADULT - ASSESSMENT
67 yo M with h/o malignancy and unprovoked DVT now with recurrent DVT after discontinuation of anticoagulation:     - Restart anticoagulation.   - COnsider hematologic work-up for hypercoagulable state  - Follow up in the office in 2 weeks. Call [phone] for an appointment with Dr. Aidan Jensen.

## 2018-07-02 LAB
ALBUMIN SERPL ELPH-MCNC: 4.3 G/DL
ALP BLD-CCNC: 129 U/L
ALT SERPL-CCNC: 74 U/L
ANION GAP SERPL CALC-SCNC: 16 MMOL/L
AST SERPL-CCNC: 43 U/L
BASOPHILS # BLD AUTO: 0.03 K/UL
BASOPHILS NFR BLD AUTO: 0.3 %
BILIRUB SERPL-MCNC: 0.6 MG/DL
BUN SERPL-MCNC: 24 MG/DL
CALCIUM SERPL-MCNC: 8.8 MG/DL
CHLORIDE SERPL-SCNC: 105 MMOL/L
CHOLEST SERPL-MCNC: 133 MG/DL
CHOLEST/HDLC SERPL: 3.7 RATIO
CO2 SERPL-SCNC: 23 MMOL/L
CREAT SERPL-MCNC: 1.34 MG/DL
EOSINOPHIL # BLD AUTO: 0.19 K/UL
EOSINOPHIL NFR BLD AUTO: 1.8 %
GLUCOSE SERPL-MCNC: 94 MG/DL
HBA1C MFR BLD HPLC: 5.5 %
HCT VFR BLD CALC: 44.5 %
HDLC SERPL-MCNC: 36 MG/DL
HGB BLD-MCNC: 13.5 G/DL
IMM GRANULOCYTES NFR BLD AUTO: 0.5 %
LDLC SERPL CALC-MCNC: 72 MG/DL
LYMPHOCYTES # BLD AUTO: 2.84 K/UL
LYMPHOCYTES NFR BLD AUTO: 26.8 %
MAN DIFF?: NORMAL
MCHC RBC-ENTMCNC: 29.2 PG
MCHC RBC-ENTMCNC: 30.3 GM/DL
MCV RBC AUTO: 96.3 FL
MONOCYTES # BLD AUTO: 1.12 K/UL
MONOCYTES NFR BLD AUTO: 10.6 %
NEUTROPHILS # BLD AUTO: 6.35 K/UL
NEUTROPHILS NFR BLD AUTO: 60 %
PLATELET # BLD AUTO: 310 K/UL
POTASSIUM SERPL-SCNC: 4.8 MMOL/L
PROT SERPL-MCNC: 7.1 G/DL
RBC # BLD: 4.62 M/UL
RBC # FLD: 14.7 %
SODIUM SERPL-SCNC: 144 MMOL/L
TRIGL SERPL-MCNC: 125 MG/DL
WBC # FLD AUTO: 10.58 K/UL

## 2018-07-09 ENCOUNTER — APPOINTMENT (OUTPATIENT)
Dept: HEMATOLOGY ONCOLOGY | Facility: CLINIC | Age: 68
End: 2018-07-09
Payer: MEDICARE

## 2018-07-09 ENCOUNTER — APPOINTMENT (OUTPATIENT)
Dept: VASCULAR SURGERY | Facility: CLINIC | Age: 68
End: 2018-07-09
Payer: MEDICARE

## 2018-07-09 VITALS
SYSTOLIC BLOOD PRESSURE: 162 MMHG | HEIGHT: 68 IN | DIASTOLIC BLOOD PRESSURE: 89 MMHG | OXYGEN SATURATION: 94 % | HEART RATE: 82 BPM | BODY MASS INDEX: 26.52 KG/M2 | WEIGHT: 175 LBS

## 2018-07-09 VITALS
HEART RATE: 77 BPM | RESPIRATION RATE: 14 BRPM | HEIGHT: 68 IN | DIASTOLIC BLOOD PRESSURE: 84 MMHG | OXYGEN SATURATION: 99 % | WEIGHT: 173 LBS | SYSTOLIC BLOOD PRESSURE: 127 MMHG | TEMPERATURE: 98.3 F | BODY MASS INDEX: 26.22 KG/M2

## 2018-07-09 DIAGNOSIS — I82.409 ACUTE EMBOLISM AND THROMBOSIS OF UNSPECIFIED DEEP VEINS OF UNSPECIFIED LOWER EXTREMITY: ICD-10-CM

## 2018-07-09 DIAGNOSIS — E53.8 DEFICIENCY OF OTHER SPECIFIED B GROUP VITAMINS: ICD-10-CM

## 2018-07-09 PROCEDURE — 36415 COLL VENOUS BLD VENIPUNCTURE: CPT

## 2018-07-09 PROCEDURE — 93880 EXTRACRANIAL BILAT STUDY: CPT

## 2018-07-09 PROCEDURE — 99204 OFFICE O/P NEW MOD 45 MIN: CPT | Mod: 25

## 2018-07-09 PROCEDURE — 96372 THER/PROPH/DIAG INJ SC/IM: CPT

## 2018-07-09 PROCEDURE — 93971 EXTREMITY STUDY: CPT

## 2018-07-09 PROCEDURE — 99205 OFFICE O/P NEW HI 60 MIN: CPT

## 2018-07-09 RX ORDER — RIVAROXABAN 2.5 MG/1
TABLET, FILM COATED ORAL
Refills: 0 | Status: DISCONTINUED | COMMUNITY
End: 2018-07-09

## 2018-07-09 RX ORDER — LISINOPRIL 10 MG/1
10 TABLET ORAL
Qty: 30 | Refills: 0 | Status: ACTIVE | COMMUNITY
Start: 2018-03-20

## 2018-07-09 RX ORDER — CYANOCOBALAMIN 1000 UG/ML
1000 INJECTION INTRAMUSCULAR; SUBCUTANEOUS
Qty: 0 | Refills: 0 | Status: COMPLETED | OUTPATIENT
Start: 2018-07-09

## 2018-07-09 RX ORDER — RIVAROXABAN 15 MG/1
15 TABLET, FILM COATED ORAL
Refills: 0 | Status: ACTIVE | COMMUNITY

## 2018-07-09 RX ADMIN — CYANOCOBALAMIN 0 MCG/ML: 1000 INJECTION INTRAMUSCULAR; SUBCUTANEOUS at 00:00

## 2018-07-10 LAB
AT III PPP CHRO-ACNC: 146 %
FACT VIII ACT/NOR PPP: 107 %
HCYS SERPL-MCNC: 18.2 UMOL/L
PROT C PPP CHRO-ACNC: 117 %
PROT S AG ACT/NOR PPP IA: 170 %

## 2018-07-11 LAB
FOLATE SERPL-MCNC: 5.1 NG/ML
PSA SERPL-MCNC: 2.42 NG/ML
VIT B12 SERPL-MCNC: 597 PG/ML

## 2018-07-12 DIAGNOSIS — E72.12 METHYLENETETRAHYDROFOLATE REDUCTASE DEFICIENCY: ICD-10-CM

## 2018-07-12 LAB — DNA PLOIDY SPEC FC-IMP: NORMAL

## 2018-07-15 LAB — PTR INTERP: NORMAL

## 2018-08-21 ENCOUNTER — RX RENEWAL (OUTPATIENT)
Age: 68
End: 2018-08-21

## 2018-12-14 ENCOUNTER — RX RENEWAL (OUTPATIENT)
Age: 68
End: 2018-12-14

## 2019-03-08 ENCOUNTER — RX RENEWAL (OUTPATIENT)
Age: 69
End: 2019-03-08

## 2019-07-23 PROBLEM — I45.10 UNSPECIFIED RIGHT BUNDLE-BRANCH BLOCK: Chronic | Status: ACTIVE | Noted: 2017-12-31

## 2019-07-23 PROBLEM — E11.9 TYPE 2 DIABETES MELLITUS WITHOUT COMPLICATIONS: Chronic | Status: ACTIVE | Noted: 2017-12-31

## 2019-07-23 PROBLEM — I82.409 ACUTE EMBOLISM AND THROMBOSIS OF UNSPECIFIED DEEP VEINS OF UNSPECIFIED LOWER EXTREMITY: Chronic | Status: ACTIVE | Noted: 2017-12-31

## 2019-07-23 PROBLEM — R25.1 TREMOR, UNSPECIFIED: Chronic | Status: ACTIVE | Noted: 2018-01-02

## 2019-07-23 PROBLEM — R00.0 TACHYCARDIA, UNSPECIFIED: Chronic | Status: ACTIVE | Noted: 2017-12-31

## 2019-07-23 PROBLEM — C64.1 MALIGNANT NEOPLASM OF RIGHT KIDNEY, EXCEPT RENAL PELVIS: Chronic | Status: ACTIVE | Noted: 2017-12-31

## 2019-07-23 PROBLEM — I10 ESSENTIAL (PRIMARY) HYPERTENSION: Chronic | Status: ACTIVE | Noted: 2017-12-31

## 2019-07-26 ENCOUNTER — APPOINTMENT (OUTPATIENT)
Dept: HEART AND VASCULAR | Facility: CLINIC | Age: 69
End: 2019-07-26
Payer: MEDICARE

## 2019-07-26 PROCEDURE — 93923 UPR/LXTR ART STDY 3+ LVLS: CPT

## 2019-08-02 ENCOUNTER — APPOINTMENT (OUTPATIENT)
Dept: HEART AND VASCULAR | Facility: CLINIC | Age: 69
End: 2019-08-02
Payer: MEDICARE

## 2019-08-02 VITALS
HEIGHT: 68 IN | OXYGEN SATURATION: 95 % | SYSTOLIC BLOOD PRESSURE: 120 MMHG | TEMPERATURE: 97.9 F | WEIGHT: 176.99 LBS | HEART RATE: 60 BPM | BODY MASS INDEX: 26.82 KG/M2 | DIASTOLIC BLOOD PRESSURE: 60 MMHG

## 2019-08-02 DIAGNOSIS — I48.92 UNSPECIFIED ATRIAL FLUTTER: ICD-10-CM

## 2019-08-02 DIAGNOSIS — R14.0 ABDOMINAL DISTENSION (GASEOUS): ICD-10-CM

## 2019-08-02 DIAGNOSIS — I70.90 UNSPECIFIED ATHEROSCLEROSIS: ICD-10-CM

## 2019-08-02 DIAGNOSIS — Z13.6 ENCOUNTER FOR SCREENING FOR CARDIOVASCULAR DISORDERS: ICD-10-CM

## 2019-08-02 PROCEDURE — 93000 ELECTROCARDIOGRAM COMPLETE: CPT

## 2019-08-02 PROCEDURE — 99204 OFFICE O/P NEW MOD 45 MIN: CPT

## 2019-08-06 ENCOUNTER — OUTPATIENT (OUTPATIENT)
Dept: OUTPATIENT SERVICES | Facility: HOSPITAL | Age: 69
LOS: 1 days | End: 2019-08-06
Payer: MEDICARE

## 2019-08-06 ENCOUNTER — APPOINTMENT (OUTPATIENT)
Dept: ULTRASOUND IMAGING | Facility: HOSPITAL | Age: 69
End: 2019-08-06
Payer: MEDICARE

## 2019-08-06 DIAGNOSIS — Z98.890 OTHER SPECIFIED POSTPROCEDURAL STATES: Chronic | ICD-10-CM

## 2019-08-06 DIAGNOSIS — H26.9 UNSPECIFIED CATARACT: Chronic | ICD-10-CM

## 2019-08-06 DIAGNOSIS — Z41.9 ENCOUNTER FOR PROCEDURE FOR PURPOSES OTHER THAN REMEDYING HEALTH STATE, UNSPECIFIED: Chronic | ICD-10-CM

## 2019-08-06 PROCEDURE — 76705 ECHO EXAM OF ABDOMEN: CPT | Mod: 26

## 2019-08-06 PROCEDURE — 93979 VASCULAR STUDY: CPT

## 2019-08-06 PROCEDURE — 93979 VASCULAR STUDY: CPT | Mod: 26

## 2019-08-06 PROCEDURE — 76705 ECHO EXAM OF ABDOMEN: CPT

## 2019-08-12 LAB
ALBUMIN SERPL ELPH-MCNC: 4.2 G/DL
ALP BLD-CCNC: 123 U/L
ALT SERPL-CCNC: 16 U/L
ANION GAP SERPL CALC-SCNC: 14 MMOL/L
AST SERPL-CCNC: 12 U/L
B2 GLYCOPROT1 IGG SER-ACNC: <5 SGU
B2 GLYCOPROT1 IGM SER-ACNC: <5 SMU
BILIRUB SERPL-MCNC: 0.2 MG/DL
BUN SERPL-MCNC: 33 MG/DL
CALCIUM SERPL-MCNC: 9 MG/DL
CARDIOLIPIN AB SER IA-ACNC: NEGATIVE
CHLORIDE SERPL-SCNC: 107 MMOL/L
CO2 SERPL-SCNC: 24 MMOL/L
CREAT SERPL-MCNC: 1.53 MG/DL
GLUCOSE SERPL-MCNC: 134 MG/DL
POTASSIUM SERPL-SCNC: 4.8 MMOL/L
PROT SERPL-MCNC: 6.4 G/DL
SODIUM SERPL-SCNC: 145 MMOL/L

## 2019-08-14 ENCOUNTER — APPOINTMENT (OUTPATIENT)
Dept: HEART AND VASCULAR | Facility: CLINIC | Age: 69
End: 2019-08-14
Payer: MEDICARE

## 2019-08-14 PROCEDURE — 93970 EXTREMITY STUDY: CPT

## 2019-08-14 PROCEDURE — 93880 EXTRACRANIAL BILAT STUDY: CPT

## 2019-08-14 PROCEDURE — 93926 LOWER EXTREMITY STUDY: CPT

## 2019-08-14 PROCEDURE — ZZZZZ: CPT

## 2019-08-14 NOTE — PHYSICAL EXAM
[Arterial Pulses Normal] : the arterial pulses were normal [Veins - Varicosity Changes] : no varicosital changes were noted in the lower extremities [] : no respiratory distress [Exaggerated Use Of Accessory Muscles For Inspiration] : no accessory muscle use [Respiration, Rhythm And Depth] : normal respiratory rhythm and effort [Auscultation Breath Sounds / Voice Sounds] : lungs were clear to auscultation bilaterally [Abdomen Soft] : soft [Abdomen Tenderness] : non-tender [FreeTextEntry1] : as above

## 2019-08-14 NOTE — REASON FOR VISIT
[FreeTextEntry1] : 70 yo M here for vascular evaluation and to establish care, he has a PMH bilateral DVT (appears unprovoked) 2017 on xarelto, atrial flutter s/p ablation ~ 2012, DM, CKD, HTN, HLD, s/p partial nephrectomy\par \par Pt presenting today with several month hx of leg swelling and concern that small wound on left anterior shin is not healing.  Treated for cellulitis of wound ~ 3 weeks with improvement in surrounding erythema.  Pt had venous US performed at that time at outside facility with no new thrombus per daughter.   Pt had a DVT of both legs.  He had a short flight prior to event.  Seen by heme with hypercoag workup negative.  Per pt there was a brief period when he was not taking the xarelto but says he is now.   \par \par No exertional leg pain, chest pain, or SOB.  Per daughter has had a decline in cognitive function. Had normal ROSA's.  \par \par Today pt with EKG showing atrial flutter.  Pt had an ablation ~ 2012 in [place] Dr. Lina Ang who has since moved out of [facility].  Pt was not on anticoagulation for flutter.  Only started xarelto after DVT.\par \par EtoH use - drinks 3-4 times/week, unclear how much he consumes, no hx of withdrawal\par Non smoker

## 2019-08-14 NOTE — ASSESSMENT
[FreeTextEntry1] : 69 M\par \par Assesssment:\par 1. DVT - dx 2017, no significant provoking factor, short flight should not cause a DVT.  Hypercoag work up negative to date. APLS studies pending.  On xarelto 20mg.\par 2. LE Swelling - bilateral, L > R, wound on shin started 2/2 trauma, likely non healing from chronic venous HTN.  ROSA's normal. Art duplex pending.  ECHO normal.  Lungs clear.  Want to rule out liver disease given distended abd, elevated LFT,s EtOH use\par 3. Atrial Flutter - s/p ablation ~ 2012 per patient and daughter.  Dr. Lina Ang prior EP.  Pt was not on AC for his flutter, only started xarelto for his DVT. \par 4. Diabetes - controlled on metformin\par 5. HTN - controlled on lisinopril and metoprolol\par \par Plan:\par - cont xarelto 20mg for unprovoked DVT and atrial flutter (CHADSVASC score at least 3)\par - check liver US given EtOH history, distended abdomen, leg swelling, mildly elevated LFTs\par - check LE venous US given leg swelling for evidence of new thrombus, venous insufficiency\par - rec importance of compression therapy for lower extremity swelling and to help with wound healing.  If unable to use stockings will rec Circaid wraps.\par - check LE arterial duplex given ulcer and risk factors for PAD.  ROSA's normal. Poor wound healing more likely 2/2 chronic venous HTN\par - check carotid duplex US given calcifications noted incidentally on dental xray.  Also pt with declining cognitive function, unclear if vascular in nature.  \par - check APLS labs as not performed in hypercoag workup.  F/u anti cardiolipin Ab, anti beta 2 GP Ab. \par - check aortic US for AAA screening.  Strongly pulsatile on exam.  \par - atrial flutter on EKG today.  Per patient has not been in flutter since time of ablation.  Refer to EP Dr. Thornton.

## 2019-08-15 ENCOUNTER — APPOINTMENT (OUTPATIENT)
Dept: HEART AND VASCULAR | Facility: CLINIC | Age: 69
End: 2019-08-15
Payer: MEDICARE

## 2019-08-15 ENCOUNTER — NON-APPOINTMENT (OUTPATIENT)
Age: 69
End: 2019-08-15

## 2019-08-15 VITALS
BODY MASS INDEX: 25.54 KG/M2 | DIASTOLIC BLOOD PRESSURE: 74 MMHG | HEART RATE: 68 BPM | WEIGHT: 168 LBS | SYSTOLIC BLOOD PRESSURE: 168 MMHG

## 2019-08-15 PROCEDURE — 99205 OFFICE O/P NEW HI 60 MIN: CPT

## 2019-08-15 PROCEDURE — 93000 ELECTROCARDIOGRAM COMPLETE: CPT

## 2019-08-15 RX ORDER — METOPROLOL TARTRATE 25 MG/1
25 TABLET, FILM COATED ORAL
Qty: 30 | Refills: 5 | Status: ACTIVE | COMMUNITY

## 2019-08-15 RX ORDER — LISINOPRIL 5 MG/1
5 TABLET ORAL DAILY
Qty: 30 | Refills: 3 | Status: DISCONTINUED | COMMUNITY
Start: 2018-02-22 | End: 2019-08-15

## 2019-08-15 RX ORDER — REPAGLINIDE 1 MG/1
1 TABLET ORAL 3 TIMES DAILY
Qty: 90 | Refills: 2 | Status: DISCONTINUED | COMMUNITY
Start: 2018-02-23 | End: 2019-08-15

## 2019-08-16 NOTE — PHYSICAL EXAM
[General Appearance - Well Developed] : well developed [Normal Appearance] : normal appearance [Well Groomed] : well groomed [General Appearance - Well Nourished] : well nourished [No Deformities] : no deformities [General Appearance - In No Acute Distress] : no acute distress [Normal Conjunctiva] : the conjunctiva exhibited no abnormalities [Normal Jugular Venous V Waves Present] : normal jugular venous V waves present [5th Left ICS - MCL] : palpated at the 5th LICS in the midclavicular line [No Precordial Heave] : no precordial heave was noted [Normal] : normal [Apical Thrill] : no thrill palpable at the apex [Normal Rate] : normal [Rhythm Regular] : regular [Normal S1] : normal S1 [Normal S2] : normal S2 [Click] : no click [Distant] : the heart sounds were ~L not distant [Pericardial Rub] : no pericardial rub [] : no respiratory distress [Respiration, Rhythm And Depth] : normal respiratory rhythm and effort [Exaggerated Use Of Accessory Muscles For Inspiration] : no accessory muscle use [Auscultation Breath Sounds / Voice Sounds] : lungs were clear to auscultation bilaterally [Abnormal Walk] : normal gait [Cyanosis, Localized] : no localized cyanosis [Skin Color & Pigmentation] : normal skin color and pigmentation [Oriented To Time, Place, And Person] : oriented to person, place, and time [Impaired Insight] : insight and judgment were intact [Affect] : the affect was normal [Mood] : the mood was normal [No Anxiety] : not feeling anxious

## 2019-08-16 NOTE — HISTORY OF PRESENT ILLNESS
[FreeTextEntry1] : Mr. Santos is a 69 year old male with ?history of aflutter vs. re-entrant tachycardia s/p ablation in 2012 at [facility], HTN, HLD, diabetes, CKD, sleep apnea (currently not using CPAP), bilateral DVTs on xarelto 15mg, who was recently found to be in atrial flutter and presents for initial evaluation.\par \par He states that he had a atrial flutter ablation in 2012 at [facility]; but after further questioning it may have been a SVT; as he does not ever remember being on full dose anticoagulation.  He has been experiencing lower extremity edema and blistering.  He went to see vascular cardiology and was found to be in atrial flutter.  He denies any palpitations, chest pain, SOB, syncope, near syncope.  His ambulation is limited by leg pain.  He also complains of a hand tremor and has an appointment to see a neurologist.  \par

## 2019-08-16 NOTE — REASON FOR VISIT
[Initial Evaluation] : an initial evaluation of [Family Member] : family member [FreeTextEntry1] : atrial flutter

## 2019-08-16 NOTE — DISCUSSION/SUMMARY
[FreeTextEntry1] : Mr. Santos is a 69 year old male with ?history of aflutter vs. re-entrant tachycardia s/p ablation in 2012 at [facility], HTN, HLD, diabetes, CKD, sleep apnea (currently not using CPAP), bilateral DVTs on xarelto 15mg, who was recently found to be in atrial flutter and presents for initial evaluation.  He is in normal sinus rhythm today and notes no change in the way he feels today vs. when he had the EKG in atrial flutter.  We discussed what atrial flutter is and the association with stroke.  He should be on full dose Xarelto and I will reach out to his cardiologist to discuss this in more detail.  We discussed treatment with an ablation or an antiarrhythmic medication vs. observation/rate control.  Given his lack of symptoms and extensive history of DVT's I do not think a rhythm control strategy is necessary at this time.  We discussed a repeat sleep study that can be ordered by his PMD to see if he needed to restart using CPAP.  No other EP interventions at this time.  He will follow up with his primary cardiologist and be referred back to our clinic as needed.  He knows to call with any questions or concerns.

## 2019-08-23 ENCOUNTER — APPOINTMENT (OUTPATIENT)
Dept: HEART AND VASCULAR | Facility: CLINIC | Age: 69
End: 2019-08-23
Payer: MEDICARE

## 2019-08-23 VITALS
SYSTOLIC BLOOD PRESSURE: 130 MMHG | HEART RATE: 66 BPM | BODY MASS INDEX: 26.22 KG/M2 | OXYGEN SATURATION: 99 % | HEIGHT: 67.72 IN | DIASTOLIC BLOOD PRESSURE: 72 MMHG | TEMPERATURE: 98.8 F | WEIGHT: 170.99 LBS

## 2019-08-23 PROCEDURE — 99214 OFFICE O/P EST MOD 30 MIN: CPT

## 2019-08-23 NOTE — ASSESSMENT
[FreeTextEntry1] : 69 M\par \par Assesssment:\par 1. DVT - dx 2017, no significant provoking factor, short flight should not cause a DVT.  Hypercoag work up negative.  On xarelto 20mg.\par 2. LE Swelling - bilateral, L > R below knee, no venous insufficiency on duplex, + Stemmers and varicosities, likely combination of venous insufficiency and lymphedema \par 3. Atrial Flutter - seen by Dr. Thornton, no intervention planned given asymptomatic, in sinus at last visit\par 4. Diabetes - controlled on metformin\par 5. HTN - controlled on lisinopril and metoprolol\par \par Plan:\par - cont xarelto 20mg for unprovoked DVT and atrial flutter (CHADSVASC score 3)\par - wrapped legs bilaterally with ace wraps as patient non compliant, to rtc next week for compression.  When swelling reduced will trial compression stockings.  \par - rtc 1 week

## 2019-08-23 NOTE — PHYSICAL EXAM
[] : no respiratory distress [Exaggerated Use Of Accessory Muscles For Inspiration] : no accessory muscle use [Respiration, Rhythm And Depth] : normal respiratory rhythm and effort [Auscultation Breath Sounds / Voice Sounds] : lungs were clear to auscultation bilaterally [Arterial Pulses Normal] : the arterial pulses were normal [Veins - Varicosity Changes] : no varicosital changes were noted in the lower extremities [Abdomen Soft] : soft [Abdomen Tenderness] : non-tender [FreeTextEntry1] : as above

## 2019-08-23 NOTE — REASON FOR VISIT
[FreeTextEntry1] : 70 yo M here for vascular evaluation and to establish care, he has a PMH bilateral DVT (appears unprovoked) 2017 on xarelto, atrial flutter,  AVNRT s/p ablation ~ 2012, DM, CKD, HTN, HLD, s/p partial nephrectomy\par \par 8/23/2019:  pt has not been using compression stockings, only compliant for a couple days.  Swelling is unchanged, possibly a little worse.  Wound on left shin almost completely healed.  Says he is agreeable but that the stockings were too tight.

## 2019-08-27 ENCOUNTER — APPOINTMENT (OUTPATIENT)
Age: 69
End: 2019-08-27

## 2019-08-27 ENCOUNTER — OUTPATIENT (OUTPATIENT)
Dept: OUTPATIENT SERVICES | Facility: HOSPITAL | Age: 69
LOS: 1 days | End: 2019-08-27
Payer: MEDICARE

## 2019-08-27 VITALS
OXYGEN SATURATION: 98 % | DIASTOLIC BLOOD PRESSURE: 57 MMHG | HEART RATE: 70 BPM | SYSTOLIC BLOOD PRESSURE: 134 MMHG | TEMPERATURE: 97 F | RESPIRATION RATE: 16 BRPM

## 2019-08-27 DIAGNOSIS — Z98.890 OTHER SPECIFIED POSTPROCEDURAL STATES: Chronic | ICD-10-CM

## 2019-08-27 DIAGNOSIS — M81.0 AGE-RELATED OSTEOPOROSIS WITHOUT CURRENT PATHOLOGICAL FRACTURE: ICD-10-CM

## 2019-08-27 DIAGNOSIS — Z41.9 ENCOUNTER FOR PROCEDURE FOR PURPOSES OTHER THAN REMEDYING HEALTH STATE, UNSPECIFIED: Chronic | ICD-10-CM

## 2019-08-27 DIAGNOSIS — H26.9 UNSPECIFIED CATARACT: Chronic | ICD-10-CM

## 2019-08-27 PROCEDURE — 96365 THER/PROPH/DIAG IV INF INIT: CPT

## 2019-08-27 RX ORDER — ZOLEDRONIC ACID 5 MG/100ML
5 INJECTION, SOLUTION INTRAVENOUS ONCE
Refills: 0 | Status: COMPLETED | OUTPATIENT
Start: 2019-08-27 | End: 2019-08-27

## 2019-08-27 RX ADMIN — ZOLEDRONIC ACID 100 MILLIGRAM(S): 5 INJECTION, SOLUTION INTRAVENOUS at 15:23

## 2019-08-27 RX ADMIN — ZOLEDRONIC ACID 5 MILLIGRAM(S): 5 INJECTION, SOLUTION INTRAVENOUS at 16:23

## 2019-08-30 ENCOUNTER — APPOINTMENT (OUTPATIENT)
Dept: HEART AND VASCULAR | Facility: CLINIC | Age: 69
End: 2019-08-30

## 2019-08-30 VITALS
WEIGHT: 167.99 LBS | HEIGHT: 67.72 IN | TEMPERATURE: 98.2 F | HEART RATE: 77 BPM | OXYGEN SATURATION: 96 % | DIASTOLIC BLOOD PRESSURE: 60 MMHG | SYSTOLIC BLOOD PRESSURE: 130 MMHG | BODY MASS INDEX: 25.76 KG/M2

## 2019-08-30 DIAGNOSIS — F10.10 ALCOHOL ABUSE, UNCOMPLICATED: ICD-10-CM

## 2019-08-30 DIAGNOSIS — I89.0 LYMPHEDEMA, NOT ELSEWHERE CLASSIFIED: ICD-10-CM

## 2019-08-30 DIAGNOSIS — Z78.9 OTHER SPECIFIED HEALTH STATUS: ICD-10-CM

## 2019-08-30 RX ORDER — RIVAROXABAN 20 MG/1
20 TABLET, FILM COATED ORAL
Qty: 90 | Refills: 3 | Status: ACTIVE | COMMUNITY
Start: 2019-08-23 | End: 1900-01-01

## 2019-08-30 NOTE — HISTORY OF PRESENT ILLNESS
[FreeTextEntry1] : 68 yo M here for vascular evaluation and to establish care, he has a PMH bilateral DVT (appears unprovoked) 2017 on xarelto, atrial flutter,  AVNRT s/p ablation ~ 2012, DM, CKD, HTN, HLD, s/p partial nephrectomy\par \par 8/23/2019:  pt has not been using compression stockings, only compliant for a couple days.  Swelling is unchanged, possibly a little worse.  Wound on left shin almost completely healed.  Says he is agreeable but that the stockings were too tight.  \par \par 8/28:  wore compression wraps until yesterday when his legs started to get painful.  Swelling improving.  He has small ulceration left anterior shin.  No tenderness, discharge, odor.  He has been putting antibiotic cream and covering it with bandaid. No fevers.  Hypercoag workup negative

## 2019-08-30 NOTE — ASSESSMENT
[FreeTextEntry1] : 69 M\par \par Assesssment:\par 1. DVT - dx 2017, no significant provoking factor, short flight should not cause a DVT.  Hypercoag work up negative.  On xarelto 20mg.\par 2. Lymphedema - bilateral LE swelling below knee improving, no venous insufficiency on duplex\par 3. Atrial Flutter - seen by Dr. Thornton, no intervention planned given asymptomatic, in sinus at last visit\par 4. Diabetes - controlled on metformin\par 5. HTN - controlled on lisinopril and metoprolol\par \par Plan:\par - cont xarelto 20mg for unprovoked DVT and atrial flutter (CHADSVASC score 3)\par - swelling improving, small anterior shin ulceration however no weeping and drainage as before.  Will try to find pt a therapist who can perform manual lymphatic drainage.  Cont compression therapy.  \par - rtc in 2-3 months, sooner if pt swelling or wound worsening.

## 2019-08-30 NOTE — REASON FOR VISIT
[FreeTextEntry1] : 70 yo M here for vascular evaluation and to establish care, he has a PMH bilateral DVT (appears unprovoked) 2017 on xarelto, atrial flutter,  AVNRT s/p ablation ~ 2012, DM, CKD, HTN, HLD, s/p partial nephrectomy\par \par 8/23/2019:  pt has not been using compression stockings, only compliant for a couple days.  Swelling is unchanged, possibly a little worse.  Wound on left shin almost completely healed.  Says he is agreeable but that the stockings were too tight.  \par \par 8/28:  wore compression wraps until yesterday when his legs started to get painful.  Swelling improving.  He has small ulceration left anterior shin.  No tenderness, discharge, odor.  He has been putting antibiotic cream and covering it with bandaid.

## 2019-08-30 NOTE — PHYSICAL EXAM
[] : no respiratory distress [Exaggerated Use Of Accessory Muscles For Inspiration] : no accessory muscle use [Respiration, Rhythm And Depth] : normal respiratory rhythm and effort [Auscultation Breath Sounds / Voice Sounds] : lungs were clear to auscultation bilaterally [Arterial Pulses Normal] : the arterial pulses were normal [Veins - Varicosity Changes] : no varicosital changes were noted in the lower extremities [Abdomen Tenderness] : non-tender [Abdomen Soft] : soft [FreeTextEntry1] : as above

## 2020-02-06 ENCOUNTER — APPOINTMENT (OUTPATIENT)
Dept: NEUROLOGY | Facility: CLINIC | Age: 70
End: 2020-02-06
Payer: MEDICARE

## 2020-02-06 VITALS
HEART RATE: 73 BPM | WEIGHT: 173 LBS | HEIGHT: 67 IN | DIASTOLIC BLOOD PRESSURE: 80 MMHG | SYSTOLIC BLOOD PRESSURE: 153 MMHG | TEMPERATURE: 98.4 F | BODY MASS INDEX: 27.15 KG/M2 | OXYGEN SATURATION: 100 %

## 2020-02-06 DIAGNOSIS — R25.1 TREMOR, UNSPECIFIED: ICD-10-CM

## 2020-02-06 DIAGNOSIS — Z86.69 PERSONAL HISTORY OF OTHER DISEASES OF THE NERVOUS SYSTEM AND SENSE ORGANS: ICD-10-CM

## 2020-02-06 DIAGNOSIS — R41.3 OTHER AMNESIA: ICD-10-CM

## 2020-02-06 PROCEDURE — 99203 OFFICE O/P NEW LOW 30 MIN: CPT

## 2020-02-06 NOTE — PHYSICAL EXAM
[FreeTextEntry1] : Examination this gentleman accompanied by his wife his memory is grossly intact although formal assessment was not carried out cranial nerves show full l movements fundi benign lower cranial nerves are normal\par \par He has evidence of tremor. There is mild dysmetria very mild cogwheeling and rigidity but no evidence of any abnormal movements in the form of dyskinesias.\par \par Flex. He diminished his lower extremities. She has no bruits. His limitation movement involving his neck area per

## 2020-02-06 NOTE — HISTORY OF PRESENT ILLNESS
[FreeTextEntry1] : Is a 71-year-old gentleman with a by his daughter he has 2 problems one being tremor second being a memory loss. She has a diagnosis of lymphedema he's had deep venous thrombosis he is on L. [place]di[place].\par \par Regarding his memory he has not had any episodes of a specific confusion that either the patient was defibrillated. In addition he has had significant depression especially over her family circumstances.\par \par His tremors at times difficulty using his arms in a coordinated smooth manor.

## 2020-02-10 ENCOUNTER — EMERGENCY (EMERGENCY)
Facility: HOSPITAL | Age: 70
LOS: 1 days | Discharge: HOME | End: 2020-02-10
Attending: EMERGENCY MEDICINE | Admitting: EMERGENCY MEDICINE
Payer: MEDICARE

## 2020-02-10 VITALS
TEMPERATURE: 98 F | RESPIRATION RATE: 18 BRPM | WEIGHT: 173.28 LBS | OXYGEN SATURATION: 100 % | HEART RATE: 87 BPM | SYSTOLIC BLOOD PRESSURE: 190 MMHG | DIASTOLIC BLOOD PRESSURE: 78 MMHG

## 2020-02-10 VITALS
DIASTOLIC BLOOD PRESSURE: 72 MMHG | OXYGEN SATURATION: 100 % | HEART RATE: 80 BPM | SYSTOLIC BLOOD PRESSURE: 155 MMHG | RESPIRATION RATE: 18 BRPM

## 2020-02-10 DIAGNOSIS — Z98.890 OTHER SPECIFIED POSTPROCEDURAL STATES: Chronic | ICD-10-CM

## 2020-02-10 DIAGNOSIS — D32.9 BENIGN NEOPLASM OF MENINGES, UNSPECIFIED: ICD-10-CM

## 2020-02-10 DIAGNOSIS — Z41.9 ENCOUNTER FOR PROCEDURE FOR PURPOSES OTHER THAN REMEDYING HEALTH STATE, UNSPECIFIED: Chronic | ICD-10-CM

## 2020-02-10 DIAGNOSIS — H26.9 UNSPECIFIED CATARACT: Chronic | ICD-10-CM

## 2020-02-10 LAB
ANION GAP SERPL CALC-SCNC: 14 MMOL/L — SIGNIFICANT CHANGE UP (ref 5–17)
APTT BLD: 40.9 SEC — HIGH (ref 27.5–36.3)
BASOPHILS # BLD AUTO: 0.05 K/UL — SIGNIFICANT CHANGE UP (ref 0–0.2)
BASOPHILS NFR BLD AUTO: 0.5 % — SIGNIFICANT CHANGE UP (ref 0–2)
BLD GP AB SCN SERPL QL: NEGATIVE — SIGNIFICANT CHANGE UP
BUN SERPL-MCNC: 23 MG/DL — SIGNIFICANT CHANGE UP (ref 7–23)
CALCIUM SERPL-MCNC: 9.2 MG/DL — SIGNIFICANT CHANGE UP (ref 8.4–10.5)
CHLORIDE SERPL-SCNC: 104 MMOL/L — SIGNIFICANT CHANGE UP (ref 96–108)
CO2 SERPL-SCNC: 23 MMOL/L — SIGNIFICANT CHANGE UP (ref 22–31)
CREAT SERPL-MCNC: 1.62 MG/DL — HIGH (ref 0.5–1.3)
EOSINOPHIL # BLD AUTO: 0.13 K/UL — SIGNIFICANT CHANGE UP (ref 0–0.5)
EOSINOPHIL NFR BLD AUTO: 1.2 % — SIGNIFICANT CHANGE UP (ref 0–6)
GLUCOSE SERPL-MCNC: 164 MG/DL — HIGH (ref 70–99)
HCT VFR BLD CALC: 43.4 % — SIGNIFICANT CHANGE UP (ref 39–50)
HGB BLD-MCNC: 13.1 G/DL — SIGNIFICANT CHANGE UP (ref 13–17)
IMM GRANULOCYTES NFR BLD AUTO: 0.5 % — SIGNIFICANT CHANGE UP (ref 0–1.5)
INR BLD: 2.18 — HIGH (ref 0.88–1.16)
LYMPHOCYTES # BLD AUTO: 2.8 K/UL — SIGNIFICANT CHANGE UP (ref 1–3.3)
LYMPHOCYTES # BLD AUTO: 25.6 % — SIGNIFICANT CHANGE UP (ref 13–44)
MCHC RBC-ENTMCNC: 29.7 PG — SIGNIFICANT CHANGE UP (ref 27–34)
MCHC RBC-ENTMCNC: 30.2 GM/DL — LOW (ref 32–36)
MCV RBC AUTO: 98.4 FL — SIGNIFICANT CHANGE UP (ref 80–100)
MONOCYTES # BLD AUTO: 1.01 K/UL — HIGH (ref 0–0.9)
MONOCYTES NFR BLD AUTO: 9.2 % — SIGNIFICANT CHANGE UP (ref 2–14)
NEUTROPHILS # BLD AUTO: 6.89 K/UL — SIGNIFICANT CHANGE UP (ref 1.8–7.4)
NEUTROPHILS NFR BLD AUTO: 63 % — SIGNIFICANT CHANGE UP (ref 43–77)
NRBC # BLD: 0 /100 WBCS — SIGNIFICANT CHANGE UP (ref 0–0)
PLATELET # BLD AUTO: 270 K/UL — SIGNIFICANT CHANGE UP (ref 150–400)
POTASSIUM SERPL-MCNC: 5.4 MMOL/L — HIGH (ref 3.5–5.3)
POTASSIUM SERPL-SCNC: 5.4 MMOL/L — HIGH (ref 3.5–5.3)
PROTHROM AB SERPL-ACNC: 25.4 SEC — HIGH (ref 10–12.9)
RBC # BLD: 4.41 M/UL — SIGNIFICANT CHANGE UP (ref 4.2–5.8)
RBC # FLD: 12.9 % — SIGNIFICANT CHANGE UP (ref 10.3–14.5)
RH IG SCN BLD-IMP: POSITIVE — SIGNIFICANT CHANGE UP
SODIUM SERPL-SCNC: 141 MMOL/L — SIGNIFICANT CHANGE UP (ref 135–145)
WBC # BLD: 10.93 K/UL — HIGH (ref 3.8–10.5)
WBC # FLD AUTO: 10.93 K/UL — HIGH (ref 3.8–10.5)

## 2020-02-10 PROCEDURE — 93010 ELECTROCARDIOGRAM REPORT: CPT

## 2020-02-10 PROCEDURE — 99284 EMERGENCY DEPT VISIT MOD MDM: CPT | Mod: 25

## 2020-02-10 PROCEDURE — 80048 BASIC METABOLIC PNL TOTAL CA: CPT

## 2020-02-10 PROCEDURE — 99284 EMERGENCY DEPT VISIT MOD MDM: CPT

## 2020-02-10 PROCEDURE — 86901 BLOOD TYPING SEROLOGIC RH(D): CPT

## 2020-02-10 PROCEDURE — 93005 ELECTROCARDIOGRAM TRACING: CPT

## 2020-02-10 PROCEDURE — 99283 EMERGENCY DEPT VISIT LOW MDM: CPT

## 2020-02-10 PROCEDURE — 70450 CT HEAD/BRAIN W/O DYE: CPT | Mod: 26

## 2020-02-10 PROCEDURE — 85730 THROMBOPLASTIN TIME PARTIAL: CPT

## 2020-02-10 PROCEDURE — 86850 RBC ANTIBODY SCREEN: CPT

## 2020-02-10 PROCEDURE — 82962 GLUCOSE BLOOD TEST: CPT

## 2020-02-10 PROCEDURE — 70450 CT HEAD/BRAIN W/O DYE: CPT

## 2020-02-10 PROCEDURE — 85025 COMPLETE CBC W/AUTO DIFF WBC: CPT

## 2020-02-10 PROCEDURE — 36415 COLL VENOUS BLD VENIPUNCTURE: CPT

## 2020-02-10 PROCEDURE — 85610 PROTHROMBIN TIME: CPT

## 2020-02-10 RX ORDER — SODIUM CHLORIDE 9 MG/ML
500 INJECTION INTRAMUSCULAR; INTRAVENOUS; SUBCUTANEOUS ONCE
Refills: 0 | Status: COMPLETED | OUTPATIENT
Start: 2020-02-10 | End: 2020-02-10

## 2020-02-10 RX ADMIN — SODIUM CHLORIDE 1000 MILLILITER(S): 9 INJECTION INTRAMUSCULAR; INTRAVENOUS; SUBCUTANEOUS at 18:30

## 2020-02-10 NOTE — ED PROVIDER NOTE - NSFOLLOWUPINSTRUCTIONS_ED_ALL_ED_FT
You were sent to the ED by neurologist Dr Meza after outpatient MRI of the brain this morning showed possible brain hemorrhage. Your MRI was reviewed by Neurosurgery Dr Stout and a CT of the brain was performed. There is no hemorrhage. This is likely a meningioma (benign, slow-growing tumors). There is no acute intervention, and often this is monitored without need for intervention. Please follow up with Dr Stout as an outpatient.     You have chronic kidney disease and received IV fluids in the ED. Please follow up with your primary medical doctor.    Meningioma  Meningioma is a tumor that occurs in the thin tissue that covers the brain and spinal cord (meninges). Meningiomas are usually not cancerous (benign) and do not spread to other areas. In rare cases, a meningioma may become cancerous (malignant).  What are the causes?  In many cases, the cause of this condition is not known. In some cases, meningioma may be caused by:  Having a genetic disorder that causes multiple soft tumors (neurofibromatosis 2).A change in certain genes (genetic mutation).What increases the risk?  You are more likely to develop this condition if:  You have been exposed to radiation.You are an older woman. Older women have a higher risk of meningiomas than men or children. However, men have a higher risk of malignant meningiomas.You have injured your head in the past.You have a history of breast cancer.What are the signs or symptoms?  Symptoms of this condition usually begin very slowly. The symptoms may depend on the size and location of the tumor. Possible symptoms include:  Headaches.Nausea and vomiting.Vision changes.Hearing changes.Loss of the sense of smell.Fits of uncontrolled movements (seizures).Weakness or numbness on one side of the body or in an arm or leg.Mood or personality changes.Problems with memory or thinking.How is this diagnosed?  This condition is diagnosed based on:  Results of brain imaging tests, such as a CT scan or MRI.Removal and testing of a sample of the tumor (biopsy). This may be done to confirm the diagnosis and to help determine the best treatment for the condition.How is this treated?  You may not have treatment until your symptoms start to affect your daily activities. This is because meningioma grows so slowly, and your health care provider may prefer to monitor its growth before starting treatment. If you do need treatment, it may include:  Medicines to decrease brain swelling and improve symptoms (steroids).High-energy rays (radiation therapy) to shrink or kill the tumor.Anti-cancer medicines (chemotherapy) to shrink or kill the tumor. Chemotherapy has many side effects because it also kills healthy cells.Targeted therapy. This kills cancerous cells without affecting normal cells.Surgery to remove as much of the tumor as possible.Follow these instructions at home:     Take over-the-counter and prescription medicines only as told by your health care provider.Keep all follow-up visits as told by your health care provider. This is important. You may need regular visits to monitor the growth of your tumor.Contact a health care provider if:  You have symptoms that come back.You have diarrhea.You vomit.You have abdominal pain.You cannot eat or drink as much as you need.You are weaker or more tired than usual.You are losing weight without trying.Get help right away if:  Your diarrhea, vomiting, or abdominal pain does not go away.You have new symptoms, such as vision problems or difficulty walking.You have a seizure.You have bleeding that does not stop.You have trouble breathing.You have a fever.Summary  Meningioma is a tumor that occurs in the thin tissue that covers the brain and spinal cord (meninges).Meningiomas are usually benign, which means they are not cancerous and do not spread to other areas.Symptoms of this condition usually begin very slowly. The symptoms may depend on the size and location of the tumor.Your tumor may be monitored over time. You may not need treatment until your tumor starts to affect your daily life.This information is not intended to replace advice given to you by your health care provider. Make sure you discuss any questions you have with your health care provider.    Chronic Kidney Disease, Adult  Chronic kidney disease (CKD) occurs when the kidneys become damaged slowly over a long period of time. The kidneys are a pair of organs that do many important jobs in the body, including:  Removing waste and extra fluid from the blood to make urine.Making hormones that maintain the amount of fluid in tissues and blood vessels.Maintaining the right amount of fluids and chemicals in the body.A small amount of kidney damage may not cause problems, but a large amount of damage may make it hard or impossible for the kidneys to work the way they should. If steps are not taken to slow down kidney damage or to stop it from getting worse, the kidneys may stop working permanently (end-stage renal disease or ESRD). Most of the time, CKD does not go away, but it can often be controlled. People who have CKD are usually able to live normal lives.  What are the causes?  The most common causes of this condition are diabetes and high blood pressure (hypertension). Other causes include:  Heart and blood vessel (cardiovascular) disease.Kidney diseases, such as:  Glomerulonephritis.Interstitial nephritis.Polycystic kidney disease.Renal vascular disease.Diseases that affect the immune system.Genetic diseases.Medicines that damage the kidneys, such as anti-inflammatory medicines.Being around or being in contact with poisonous (toxic) substances.A kidney or urinary infection that occurs again and again (recurs).Vasculitis. This is swelling or inflammation of the blood vessels.A problem with urine flow that may be caused by:  Cancer.Having kidney stones more than one time.An enlarged prostate, in males.What increases the risk?  You are more likely to develop this condition if you:  Are older than age 60.Are female.Are -American, , , , or .Are a current or former smoker.Are obese.Have a family history of kidney disease or failure.Often take medicines that are damaging to the kidneys.What are the signs or symptoms?  Symptoms of this condition include:  Swelling (edema) of the face, legs, ankles, or feet.Tiredness (lethargy) and having less energy.Nausea or vomiting.Confusion or trouble concentrating.Problems with urination, such as:  Painful or burning feeling during urination.Decreased urine production.Frequent urination, especially at night.Bloody urine.Muscle twitches and cramps, especially in the legs.Shortness of breath.Weakness.Loss of appetite.Metallic taste in the mouth.Trouble sleeping.Dry, itchy skin.A low blood count (anemia).Pale lining of the eyelids and surface of the eye (conjunctiva).Symptoms develop slowly and may not be obvious until the kidney damage becomes severe. It is possible to have kidney disease for years without having any symptoms.  How is this diagnosed?  This condition may be diagnosed based on:  Blood tests.Urine tests.Imaging tests, such as an ultrasound or CT scan.A test in which a sample of tissue is removed from the kidneys to be examined under a microscope (kidney biopsy).These test results will help your health care provider determine how serious the CKD is.  How is this treated?  There is no cure for most cases of this condition, but treatment usually relieves symptoms and prevents or slows the progression of the disease. Treatment may include:  Making diet changes, which may require you to avoid alcohol, salty foods (sodium), and foods that are high in potassium, calcium, and protein.Medicines:  To lower blood pressure.To control blood glucose.To relieve anemia.To relieve swelling.To protect your bones.To improve the balance of electrolytes in your blood.Removing toxic waste from the body through types of dialysis, if the kidneys can no longer do their job (kidney failure).Managing any other conditions that are causing your CKD or making it worse.Follow these instructions at home:  Medicines     Take over-the-counter and prescription medicines only as told by your health care provider. The dose of some medicines that you take may need to be adjusted.Do not take any new medicines unless approved by your health care provider. Many medicines can worsen your kidney damage.Do not take any vitamin and mineral supplements unless approved by your health care provider. Many nutritional supplements can worsen your kidney damage.General instructions     Follow your prescribed diet as told by your health care provider.Do not use any products that contain nicotine or tobacco, such as cigarettes and e-cigarettes. If you need help quitting, ask your health care provider.Monitor and track your blood pressure at home. Report changes in your blood pressure as told by your health care provider.If you are being treated for diabetes, monitor and track your blood sugar (blood glucose) levels as told by your health care provider.Maintain a healthy weight. If you need help with this, ask your health care provider.Start or continue an exercise plan. Exercise at least 30 minutes a day, 5 days a week.Keep your immunizations up to date as told by your health care provider.Keep all follow-up visits as told by your health care provider. This is important.Where to find more information  American Association of Kidney Patients: www.aakp.orgNational Kidney Foundation: www.kidney.orgAmerican Kidney Fund: www.akfinc.orgLife Options Rehabilitation Program: www.lifeoptions.org and www.kidneyschool.orgContact a health care provider if:  Your symptoms get worse.You develop new symptoms.Get help right away if:  You develop symptoms of ESRD, which include:  Headaches.Numbness in the hands or feet.Easy bruising.Frequent hiccups.Chest pain.Shortness of breath.Lack of menstruation, in women.You have a fever.You have decreased urine production.You have pain or bleeding when you urinate.Summary  Chronic kidney disease (CKD) occurs when the kidneys become damaged slowly over a long period of time.The most common causes of this condition are diabetes and high blood pressure (hypertension).There is no cure for most cases of this condition, but treatment usually relieves symptoms and prevents or slows the progression of the disease. Treatment may include a combination of medicines and lifestyle changes.This information is not intended to replace advice given to you by your health care provider. Make sure you discuss any questions you have with your health care provider.

## 2020-02-10 NOTE — ED PROVIDER NOTE - NS ED ROS FT
covering for RN on break. Pt in no acute distress, asked for assistance to bathroom. Ambulated to bathroom w/ tech. Will continue to monitor. Constitutional: No fever or chills.   Eyes: No pain, blurry vision, or discharge.  ENMT: No hearing changes, pain, discharge or infections. No neck pain or stiffness.  Cardiac: No chest pain, SOB or edema. No chest pain with exertion.  Respiratory: No cough or respiratory distress. No hemoptysis. No history of asthma or RAD.  GI: No nausea, vomiting, diarrhea or abdominal pain.  : No dysuria, frequency or burning.  MS: No myalgia, muscle weakness, joint pain or back pain.  Neuro: See HPI.  Skin: No skin rash.   Endocrine: No history of thyroid disease or diabetes.  Except as documented in the HPI, all other systems are negative.

## 2020-02-10 NOTE — ED PROVIDER NOTE - CARE PROVIDER_API CALL
Michael Stout)  Neurosurgery  [address], [place] [facility]  Phone: [phone]  Fax: [phone]  Follow Up Time:

## 2020-02-10 NOTE — ED ADULT NURSE NOTE - NSIMPLEMENTINTERV_GEN_ALL_ED
Implemented All Universal Safety Interventions:  [place] to call system. Call bell, personal items and telephone within reach. Instruct patient to call for assistance. Room bathroom lighting operational. Non-slip footwear when patient is off stretcher. Physically safe environment: no spills, clutter or unnecessary equipment. Stretcher in lowest position, wheels locked, appropriate side rails in place.

## 2020-02-10 NOTE — ED PROVIDER NOTE - OBJECTIVE STATEMENT
Pt w/ PMHx provoked DVT on Xarelto, HTN, HLD, RCC s/p R partial nephrectomy in remission, renal colic s/p lithotripsy, AVNRT s/p ablation, sent to the ED by Neuro Dr Meza s/p pt having seen him for chronic tremor. Pt was sent for MR  brain which showed ? epidural hematoma. Pt referred to the ED for NRS evaluation. Pt denies recent fall, HA, vision changes, neck pain, numbness / tingling, focal weakness, confusion, slurred speech, n/v, and other complaints. PT's daughter reports the tremors has worsened in recent months and also has diff w/ memory

## 2020-02-10 NOTE — CONSULT NOTE ADULT - ASSESSMENT
70 year old male with multiple medical problems with incidental finding of a left frontal meningioma.    PLAN:  CT Head reviewed with Dr. Stout,  No neurosurgical intervention, patient's neurological symptoms are unlikely to be related to this small mass which does not cause mass effect,  Patient can follow-up with Dr. Stout as an outpatient, [phone]

## 2020-02-10 NOTE — CONSULT NOTE ADULT - SUBJECTIVE AND OBJECTIVE BOX
HISTORY OF PRESENT ILLNESS: 70 year old male with DM, HTN, HLD, h/o DVT on Xarelto, Renal Cancer sent to ED by Dr. Meza following outpatient MRI suggestive of 'epidural bleeding' in the brain. Patient and his daughter at bedside report progressive changes to patient's memory and word finding, as well as tremulousness with outpatient work-up upcoming. Patient is on Xarelto for h/o DVT with INR of 2.1. Denies any headaches, visual or hearing changes, extremity weakness or numbness, recent falls, syncope or seizures. Denies any recent head trauma or other injury. Outpatient MRI on disc provided and uploaded to [facility] PACS.    PAST MEDICAL & SURGICAL HISTORY:  Tremor of unknown origin  RBBB  DM (diabetes mellitus)  Tachycardia, unspecified: s/p ablation 2012  Renal cancer, right: s/p R partial nephrectomy 2008  DVT of lower extremity (deep venous thrombosis): LLE OCT/2017  HTN (hypertension)  Renal stones  History of liver biopsy  H/O atrioventricular good ablation: for arrhythmia  Cataracts, both eyes: 2 separate surgeries  H/O partial nephrectomy: right  Elective surgery: left kidney stent  H/O lithotripsy: several since 1980&#x27;s    FAMILY HISTORY: n/a      SOCIAL HISTORY:  Tobacco Use: Denies  EtOH use: Denies  Substance: Denies    Allergies:  penicillins (Unknown)      REVIEW OF SYSTEMS  See HPI. No reported syncope, dizziness, chest pain, shortness of breath.    Home Medications:  Metformin, Atorvastatin, Lisinopril, Xarelto, Metoprolol      OTHER:    IVF:  sodium chloride 0.9% Bolus 500 milliLiter(s) IV Bolus once      Vital Signs Last 24 Hrs  T(C): 36.6 (10 Feb 2020 15:54), Max: 36.6 (10 Feb 2020 15:54)  T(F): 97.9 (10 Feb 2020 15:54), Max: 97.9 (10 Feb 2020 15:54)  HR: 80 (10 Feb 2020 16:30) (80 - 87)  BP: 155/72 (10 Feb 2020 16:30) (155/72 - 190/78)  BP(mean): --  RR: 18 (10 Feb 2020 16:30) (18 - 18)  SpO2: 100% (10 Feb 2020 16:30) (100% - 100%)    PHYSICAL EXAM:  Gen: NAD, AAOx3  HEENT: PERRL. EOMI. VF grossly intact. NC/AT.  Neck: FROM, nontender  Lungs: Clear b/l  Heart: S1, S2. NSR.  Abd: Soft, NT/ND. Obese. +BS  Exts: Pulses 2+ throughout  Neuro: CNs II-XII intact. 5/5 str x4 extremities. Sensation to LT intact. Intention tremor to b/l UEs. Speech clear. Following commands.    LABS:                        13.1   10.93 )-----------( 270      ( 10 Feb 2020 16:20 )             43.4     02-10    141  |  104  |  23  ----------------------------<  164<H>  5.4<H>   |  23  |  1.62<H>    Ca    9.2      10 Feb 2020 16:20      PT/INR - ( 10 Feb 2020 16:20 )   PT: 25.4 sec;   INR: 2.18          PTT - ( 10 Feb 2020 16:20 )  PTT:40.9 sec        RADIOLOGY & ADDITIONAL STUDIES: < from: CT Head No Cont (02.10.20 @ 17:47) >  IMPRESSION: Right frontal convexity isodense extra-axial lesion which most likely represents a meningioma.

## 2020-02-10 NOTE — ED PROVIDER NOTE - PHYSICAL EXAMINATION
Constitutional: Well appearing, well nourished, awake, alert, oriented to person, place, time/situation and in no apparent distress.  ENMT: Airway patent. Normal MM  Eyes: Clear bilaterally, PERRL, EOMI  Cardiac: Normal rate, regular rhythm.  Heart sounds S1, S2.  No murmurs, rubs or gallops.  Respiratory: Breaths sounds equal and clear b/l. No increased WOB, tachypnea, hypoxia, or accessory mm use. Pt speaks in full sentences.   Gastrointestinal: Abd soft, NT, ND, NABS. No guarding, rebound, or rigidity. No pulsatile abdominal masses. No organomegaly appreciated. No CVAT   Musculoskeletal: Range of motion is not limited  Neuro: Alert & Oriented x 3. CN II-XII intact. No facial droop. Clear speech. PAREDES w/ 5/5 strength x 4 ext. Normal sensation. No pronator drift. No dysdidokinesia nor dysmetria. Normal heel-to-shin. b/l UE intention tremor  Skin: Skin normal color for race, warm, dry and intact. No evidence of rash.  Psych: Alert and oriented to person, place, time/situation. normal mood and affect. no apparent risk to self or others.

## 2020-02-10 NOTE — ED ADULT TRIAGE NOTE - CHIEF COMPLAINT QUOTE
Patient had MRI today and radiologist told him there was bleeding in his brain. He denies headache, numbness, tingling, vision change.

## 2020-02-10 NOTE — ED PROVIDER NOTE - CLINICAL SUMMARY MEDICAL DECISION MAKING FREE TEXT BOX
Pt t sent to ED for outpt MRI showing ? small EDH. Asymptomatic, neuro intact. NRS consult. Dispo pending NRS recommendations

## 2020-02-10 NOTE — ED PROVIDER NOTE - PATIENT PORTAL LINK FT
You can access the FollowMyHealth Patient Portal offered by [facility] by registering at the following website: [email or web address]. By joining [employer]’s FollowMyHealth portal, you will also be able to view your health information using other applications (apps) compatible with our system.

## 2020-02-10 NOTE — ED PROVIDER NOTE - PROGRESS NOTE DETAILS
Pt seen by NRS, CT performed, meningioma. Pt may be d/c'd and f/u NRS Dr Stout. CKD, given IVF, creatinine c/w prior levels. F/u PCP

## 2020-02-12 ENCOUNTER — APPOINTMENT (OUTPATIENT)
Dept: NEUROLOGY | Facility: CLINIC | Age: 70
End: 2020-02-12
Payer: MEDICARE

## 2020-02-12 PROCEDURE — 95819 EEG AWAKE AND ASLEEP: CPT

## 2020-02-21 ENCOUNTER — APPOINTMENT (OUTPATIENT)
Dept: NEUROSURGERY | Facility: CLINIC | Age: 70
End: 2020-02-21
Payer: MEDICARE

## 2020-02-21 DIAGNOSIS — Z86.718 PERSONAL HISTORY OF OTHER VENOUS THROMBOSIS AND EMBOLISM: ICD-10-CM

## 2020-02-21 DIAGNOSIS — I10 ESSENTIAL (PRIMARY) HYPERTENSION: ICD-10-CM

## 2020-02-21 DIAGNOSIS — E11.9 TYPE 2 DIABETES MELLITUS W/OUT COMPLICATIONS: ICD-10-CM

## 2020-02-21 DIAGNOSIS — D32.9 BENIGN NEOPLASM OF MENINGES, UNSPECIFIED: ICD-10-CM

## 2020-02-21 DIAGNOSIS — Z78.9 OTHER SPECIFIED HEALTH STATUS: ICD-10-CM

## 2020-02-21 DIAGNOSIS — Z86.69 PERSONAL HISTORY OF OTHER DISEASES OF THE NERVOUS SYSTEM AND SENSE ORGANS: ICD-10-CM

## 2020-02-21 PROCEDURE — 99205 OFFICE O/P NEW HI 60 MIN: CPT

## 2020-02-23 VITALS
WEIGHT: 173 LBS | BODY MASS INDEX: 27.15 KG/M2 | HEART RATE: 71 BPM | SYSTOLIC BLOOD PRESSURE: 144 MMHG | HEIGHT: 67 IN | DIASTOLIC BLOOD PRESSURE: 80 MMHG | RESPIRATION RATE: 16 BRPM | OXYGEN SATURATION: 97 %

## 2020-02-23 PROBLEM — E11.9 DIABETES: Status: RESOLVED | Noted: 2020-02-23 | Resolved: 2020-02-23

## 2020-02-23 PROBLEM — Z86.69 HISTORY OF SLEEP APNEA: Status: RESOLVED | Noted: 2020-02-23 | Resolved: 2020-02-23

## 2020-02-23 PROBLEM — Z78.9 QUIT CONSUMING ALCOHOL IN REMOTE PAST: Status: ACTIVE | Noted: 2020-02-23

## 2020-02-23 PROBLEM — I10 HIGH BLOOD PRESSURE: Status: RESOLVED | Noted: 2020-02-23 | Resolved: 2020-02-23

## 2020-02-23 PROBLEM — Z86.718 HISTORY OF DEEP VENOUS THROMBOSIS: Status: RESOLVED | Noted: 2020-02-23 | Resolved: 2020-02-23

## 2020-02-23 RX ORDER — AMMONIUM LACTATE 12 %
12 CREAM (GRAM) TOPICAL TWICE DAILY
Qty: 1 | Refills: 5 | Status: DISCONTINUED | COMMUNITY
Start: 2019-08-30 | End: 2020-02-23

## 2020-02-23 RX ORDER — OXYBUTYNIN CHLORIDE 5 MG/1
5 TABLET ORAL
Qty: 90 | Refills: 0 | Status: DISCONTINUED | COMMUNITY
Start: 2018-01-17 | End: 2020-02-23

## 2020-02-23 NOTE — DATA REVIEWED
[de-identified] : EXAM: CT BRAIN \par \par PROCEDURE DATE: 02/10/2020 \par \par \par \par INTERPRETATION: PROCEDURE: CT head without intravenous contrast \par \par INDICATION: Headache; questionable epidural hematoma on outside MRI \par \par TECHNIQUE: Multiple axial images were obtained at 5 mm intervals from the \par skull base to the vertex. Sagittal and coronal reformatted images were \par obtained from the axial data set. The images were reviewed in brain and bone \par windows. \par \par COMPARISON: Outside MRI 2/10/2020 from [facility] dated 2/10/2020 \par \par FINDINGS: The CT examination demonstrates a isodense extra-axial lesion \par along the right frontal convexity which measures approximately 22 mm width x \par 7.6 mm AP x 19 mm height (series 2 image 22 and series 5 image 55). There is \par mass effect on the adjacent frontal lobe. This most likely represents a \par meningioma rather than epidural hematoma. \par \par There is age-appropriate volume loss. There is no midline shift. The gray \par white differentiation appears within normal limits. There is no intracranial \par hemorrhage or acute transcortical infarct. There is patchy hypodensity \par within the periventricular white matter which is nonspecific and may \par represent the sequela of small vessel ischemic disease. The bony windows \par demonstrates no fractures. The visualized paranasal sinuses are within \par normal limits. The mastoid air cells are well aerated. \par \par IMPRESSION: Right frontal convexity isodense extra-axial lesion which most \par likely represents a meningioma. \par \par \par \par \par \par \par Thank you for the opportunity to participate in the care of this patient. \par \par \par \par DEVYN ASIF M.D., ATTENDING RADIOLOGIST \par This document has been electronically signed. Feb 10 2020 6:01PM \par \par \par \par  [de-identified] : Exam requested by:\par JADE EMERY MD\par [address]\par [facility] [id number]\par SITE PERFORMED: UPPER WEST SIDE\par Patient: HERNESTO MENDOZA\par YOB: 1950\par Phone: [phone]\par MRN: [id number] Acc: [id number]\par Date of Exam: 02-\par  \par EXAM:  MRI BRAIN WITHOUT CONTRAST\par \par HISTORY:  70-year-old male. Memory loss. Tremor. Cognitive decline\par \par TECHNIQUE: Magnetic resonance imaging was performed with axial T1-weighted images, diffusion weighted axial images, gradient echo axial images, FLAIR axial and sagittal images and fast spin-echo T2-weighted axial images on a 1.5T MR unit.\par \par COMPARISON:  None.\par \par FINDINGS:\par \par There is an epidural hematoma overlying the right frontal lobe measuring 3.1 cm in greatest axial dimension by 1.4 cm in maximal thickness by 2.6 cm in craniocaudad height. There is slight localized mass effect upon the adjacent right frontal lobe although there is no edema or chronic encephalomalacia identified.\par \par There is no acute hydrocephalus, parenchymal hemorrhage, or midline shift.\par \par DWI images the brain demonstrate no evidence for acute or subacute ischemia/infarction. There is no focal restricted diffusion\par \par For the patient's age, there are normal involutional brain changes. There is no evidence for generalized brain atrophy\par \par There are chronic nonspecific white matter hyperintensities within the cerebral hemispheric white matter most likely representing foci of mild to moderate chronic small vessel white matter ischemic changes.\par \par The craniocervical junction, upper cervical spinal cord, corpus callosum, and pituitary gland are normal in appearance.\par \par There is mild thickening of the ethmoid and maxillary sinus mucosa. There is no evidence for acute sinusitis or middle ear effusion\par \par There is normal flow void in the basilar artery. There is normal flow void in the cavernous and supraclinoid segments of the internal carotid arteries bilaterally.\par \par Visualized portions of the orbits are symmetrical. There is no exophthalmos.\par \par IMPRESSION:  \par \par 1. Right frontal extra-axial epidural hematoma measuring 3.1 cm in greatest axial dimension by 1.4 cm in thickness. Slight localized mass effect upon the adjacent right frontal lobe without edema, hydrocephalus, or midline shift\par \par 2. Chronic mild to moderate cerebral hemispheric white matter ischemic changes\par \par 3. No acute ischemia/infarction on DWI images\par \par Case and findings discussed with Dr. Emery at 3:00 PM on 2/10/2020\par \par Thank you for the opportunity to participate in the care of this patient.  \par  \par Roshan Rocha MD  - Electronically Signed: 02- 3:00 PM \par Physician to Physician Direct Line is: [phone]

## 2020-02-23 NOTE — ASSESSMENT
[FreeTextEntry1] : CT head without contrast reviewed by Dr. Stout with patient and patient's daughter, demonstrates a right frontal convexity brain mass, likely a meningioma.\par Patient has no acute neurological symptoms.\par Recommend MRI brain with and without contrast for further evaluation.\par Multiple questions answered to patient and patient's daughter satisfaction. \par \par Patient's daughter states he does have Velma scan scheduled next week and she would like to obtain Velma scan prior to MRI brain.\par She will call and schedule MRI brain after that.\par After MRI brain complete, return to the office to review with Dr. Stout.\par \par Patient and patient's daughter verbalize agreement and understanding with plan.

## 2020-02-23 NOTE — REASON FOR VISIT
[Consultation] : a consultation visit [Referred By: _________] : Patient was referred by DENISHA [FreeTextEntry1] : Brain Mass

## 2020-02-23 NOTE — HISTORY OF PRESENT ILLNESS
[de-identified] : 70 year old man with past medical history HTN, kidney disease, kidney stones, diabetes, sleep apnea, DVT referred by Dr. Indio Meza for neurosurgical evaluation of brain mass.\par \par Mr. Santos comes to the appointment with his daughter, who helps provide history.\par Per patient's daughter, the patient began to experience progressive worsening memory loss and bilateral hand tremors. He was seen by Dr. Meza and MRI brain without contrast was ordered. He had MRI done and was found to have a possible RIGHT Frontal epidural hematoma. Upon these findings, Dr. Meza recommended he report to ED immediately, where he had CT head done without contrast. CT head without contrast reveals possible RIGHT frontal meningioma. He was discharged from [facility] ED and instructed to follow up with Dr. Stout for further evaluation.\par \par At today's visit, Mr. Santos does report worsening forgetfulness over the past year as well as bilateral hand tremors (LEFT worse than RIGHT). He denies focal weakness, headaches, nausea/vomiting, changes in vision/speech.\par \par Notably, he is scheduled for DaTscan to rule out Parkinson's disease next week.

## 2020-02-23 NOTE — PHYSICAL EXAM
[General Appearance - Alert] : alert [Oriented To Time, Place, And Person] : oriented to person, place, and time [Motor Tone] : muscle tone was normal in all four extremities [General Appearance - In No Acute Distress] : in no acute distress [Sclera] : the sclera and conjunctiva were normal [Motor Strength] : muscle strength was normal in all four extremities [Outer Ear] : the ears and nose were normal in appearance [Neck Appearance] : the appearance of the neck was normal [Respiration, Rhythm And Depth] : normal respiratory rhythm and effort [] : no respiratory distress [Skin Color & Pigmentation] : normal skin color and pigmentation [Abnormal Walk] : normal gait

## 2020-02-23 NOTE — CONSULT LETTER
[Dear  ___] : Dear  [unfilled], [Consult Letter:] : I had the pleasure of evaluating your patient, [unfilled]. [Please see my note below.] : Please see my note below. [Consult Closing:] : Thank you very much for allowing me to participate in the care of this patient.  If you have any questions, please do not hesitate to contact me. [Sincerely,] : Sincerely, [FreeTextEntry3] : Michael Stout MD [FreeTextEntry2] : JADE EMERY MD\par [address]\par [facility] [address]

## 2020-03-04 NOTE — PATIENT PROFILE ADULT. - PRO MENTAL HEALTH SX RECENT
C/o sore throat that started yesterday. C/o increased pain to Rt side of throat today. Last night started with a cough. And today had greenish bloody nasal drainage. Ears feeling plugged.    Visit Vitals  BP (!) 142/80   Pulse 84   Temp 99.2 °F (37.3 °C) (Tympanic)   Wt 81.5 kg   SpO2 96%   BMI 29.00 kg/m²        none

## 2020-03-17 ENCOUNTER — APPOINTMENT (OUTPATIENT)
Dept: NEUROLOGY | Facility: CLINIC | Age: 70
End: 2020-03-17

## 2020-03-26 NOTE — PRE-OP CHECKLIST - VIA
Changes In Treatment Protocol: ^PP Skin Type: III Protocol For Bath Puva: The patient received Bath PUVA. Protocol For Uva: The patient received UVA. Protocol For Nb Uva: The patient received NB UVA. Protocol For Photochemotherapy: Triamcinolone Ointment And Nbuvb: The patient received Photochemotherapy: Triamcinolone and NBUVB (triamcinolone ointment applied to all lesions prior to phototherapy). Protocol For Uva1: The patient received UVA1. Protocol For Photochemotherapy: Tar And Nbuvb (Goeckerman Treatment): The patient received Photochemotherapy: Tar and NBUVB (Goeckerman treatment). Protocol For Photochemotherapy: Mineral Oil And Broad Band Uvb: The patient received Photochemotherapy: Mineral Oil and Broad Band UVB. Protocol For Photochemotherapy: Petrolatum And Nbuvb: The patient received Photochemotherapy: Petrolatum and NBUVB (petrolatum applied to all lesions prior to phototherapy). Treatment Number: 18 Detail Level: Zone Total Body Energy: 067 Protocol For Photochemotherapy: Mineral Oil And Nbuvb: The patient received Photochemotherapy: Mineral Oil and NBUVB (mineral oil applied to all lesions prior to phototherapy). Protocol For Photochemotherapy For Severe Photoresponsive Dermatoses: Puva: The patient received Photochemotherapy for severe photoresponsive dermatoses: PUVA requiring at least 4 to 8 hours of care under direct physician supervision. Protocol For Photochemotherapy: Tar And Broad Band Uvb (Goeckerman Treatment): The patient received Photochemotherapy: Tar and Broad Band UVB (Goeckerman treatment). Protocol For Photochemotherapy For Severe Photoresponsive Dermatoses: Tar And Broad Band Uvb (Goeckerman Treatment): The patient received Photochemotherapy for severe photoresponsive dermatoses: Tar and Broad Band UVB (Goeckerman treatment) requiring at least 4 to 8 hours of care under direct physician supervision. Protocol For Protocol For Photochemotherapy For Severe Photoresponsive Dermatoses: Bath Puva: The patient received Photochemotherapy for severe photoresponsive dermatoses: Bath PUVA requiring at least 4 to 8 hours of care under direct physician supervision. Post-Care Instructions: I reviewed with the patient in detail post-care instructions. Patient is to wear sun protection. Patients may expect sunburn like redness, discomfort and scabbing. Protocol For Photochemotherapy: Baby Oil And Nbuvb: The patient received Photochemotherapy: Baby Oil and NBUVB (baby oil applied to all lesions prior to phototherapy). Protocol: NBUVB Protocol For Broad Band Uvb: The patient received Broad Band UVB. Protocol For Photochemotherapy For Severe Photoresponsive Dermatoses: Tar And Nbuvb (Goeckerman Treatment): The patient received Photochemotherapy for severe photoresponsive dermatoses: Tar and NBUVB (Goeckerman treatment) requiring at least 4 to 8 hours of care under direct physician supervision. Protocol For Nbuvb: The patient received NBUVB. Irradiance (Optional- Include Units): 3.2 Protocol For Photochemotherapy: Petrolatum And Broad Band Uvb: The patient received Photochemotherapy: Petrolatum and Broad Band UVB. Protocol For Photochemotherapy For Severe Photoresponsive Dermatoses: Petrolatum And Nbuvb: The patient received Photochemotherapy for severe photoresponsive dermatoses: Petrolatum and NBUVB requiring at least 4 to 8 hours of care under direct physician supervision. Name Of Supervising Technician: Christiano Render Post-Care In The Note: no Consent: Verbal consent obtained.  The risks were reviewed with the patient including but not limited to: burn, pigmentary changes, pain, blistering, scabbing, redness, increased risk of skin cancers, and the remote possibility of scarring. Protocol For Photochemotherapy For Severe Photoresponsive Dermatoses: Petrolatum And Broad Band Uvb: The patient received Photochemotherapyfor severe photoresponsive dermatoses: Petrolatum and Broad Band UVB requiring at least 4 to 8 hours of care under direct physician supervision. Protocol For Puva: The patient received PUVA. stretcher

## 2020-04-02 ENCOUNTER — APPOINTMENT (OUTPATIENT)
Dept: NEUROLOGY | Facility: CLINIC | Age: 70
End: 2020-04-02
Payer: MEDICARE

## 2020-04-02 DIAGNOSIS — I10 ESSENTIAL (PRIMARY) HYPERTENSION: ICD-10-CM

## 2020-04-02 DIAGNOSIS — G93.89 OTHER SPECIFIED DISORDERS OF BRAIN: ICD-10-CM

## 2020-04-02 PROCEDURE — 99213 OFFICE O/P EST LOW 20 MIN: CPT | Mod: 95

## 2020-04-02 NOTE — HISTORY OF PRESENT ILLNESS
[FreeTextEntry1] : HIPPA and Privacy explained to patient and daughter - both gave verbal consent.\par Both patient and myself are at home .\par \par Pt with meningioma \par  Tremors - Velma Scan - no evidence of Parkinson's disease- \par EEG- Bitemporal slowwing- no seizures\par  MRI head- meningioma \par Neuropsych pending results\par \par Reports no gait issues - memory issues continue\par \par

## 2020-04-02 NOTE — PHYSICAL EXAM
[FreeTextEntry1] : Pt is awake and alert - answers appropriately.\par Full EOM\par  No facial asymmetry- no aphasia\par  Able to stand - negative Romberg \par Able to lift arms \par  No gross tremors \par No abnormal movements observed\par \par \par \par \par

## 2020-04-02 NOTE — ASSESSMENT
[FreeTextEntry1] : Meningioma - Pt will need repeat MRI as per Dr Stout \par Pt will FU with primary care doctor regarding use of GADO \par  Await neuro psych \par \par No other acute issues

## 2020-04-03 ENCOUNTER — NON-APPOINTMENT (OUTPATIENT)
Age: 70
End: 2020-04-03

## 2021-10-02 NOTE — PATIENT PROFILE ADULT. - NSSUBSTANCEUSE_GEN_ALL_CORE_SD
never used 72 y old female with PMH of asthma, HF, HTN, DM and diverticulitis with multiple abdominal surgeries presents to the ED from home c/o LLQ pain. Pt reports pain is intermittent, nonradiating in the LLQ. Pt reports pain gets worse when she coughs. Denies HA, fevers, chills, CP, SOB, n/v/d, urinary s/s, weakness. Pt A&O x 4, ambulatory. Respirations spontaneous and unlabored. Abdomen soft, nontender and nondistended. Peripheral pulses strong. Skin warm ,dry and intact. Bed in lowest position, side rails up and call bell in reach. 72 y old female with significant PMH including a flutter on Eliquis, asthma, HF, HTN, DM and diverticulitis with multiple abdominal surgeries presents to the ED from home c/o LLQ pain x 1 day. Pt reports pain is intermittent, nonradiating in the LLQ. Pt reports pain gets worse when she coughs. Denies HA, fevers, chills, CP, SOB, n/v/d, urinary s/s, weakness. Pt A&O x 4, ambulatory. Respirations spontaneous and chest rise symmetrical. Wheezes noted. Abdomen soft, nontender and nondistended. Colostomy with output noted. Peripheral pulses strong. Skin warm ,dry and intact. Bed in lowest position, side rails up and call bell in reach. 72 y old female with significant PMH including a flutter on Eliquis, asthma, HF, HTN, DM and diverticulitis with multiple abdominal surgeries presents to the ED from home c/o LLQ pain x 1 day. Pt reports pain is intermittent, nonradiating in the LLQ. Pt reports pain gets worse when she coughs. Denies HA, fevers, chills, CP, SOB, n/v/d, urinary s/s, weakness. Pt A&O x 4, ambulatory. Respirations spontaneous and chest rise symmetrical. Wheezes noted. Abdomen soft, nontender and nondistended. R sided colostomy with output noted. Colostomy site wnl. Peripheral pulses strong. Skin warm ,dry and intact. Bed in lowest position, side rails up and call bell in reach.

## 2021-11-20 NOTE — ED ADULT NURSE NOTE - SUICIDE SCREENING QUESTION 3
[phone]    Onset: A COUPLE OF HOURS AGO  Location / description: BP SYSTOLIC IS OVER 180; /75  Precipitating Factors: UNKNOWN  Pain Scale (1-10), 10 highest: 0/10 0  Associated Symptoms: DENIES  What improves / worsens symptoms: N/A  Symptom specific medications: METOPROLOL 50 MG, AMLODIPINE 10 MG, LISINOPRIL 20-25 hydrochlorothiazide   Recent visits (last 3-4 weeks) for same reason or recent surgery: NONE    PLAN:   See/Call Provider within 24 hours    Patient/Caller  to follow recommendations.    Reason for Disposition  • Systolic BP  >= 180 OR Diastolic >= 110    Protocols used: BLOOD PRESSURE - HIGH-A-    PATIENT AGREES WITH 24 HOUR DISPOSITION FOR ELEVATED BLOOD PRESSURE & APPT WAS SCHEDULED WITH DR OLMOS TOMORROW 11/20/21.  
[facility]   
No

## 2022-10-27 NOTE — DISCHARGE NOTE ADULT - PRINCIPAL DIAGNOSIS
Patient reports chest pain and shortness of breath.      Triage Assessment     Row Name 10/27/22 0783       Triage Assessment (Adult)    Airway WDL WDL       Respiratory WDL    Respiratory WDL X;all    Rhythm/Pattern, Respiratory shortness of breath       Cardiac WDL    Cardiac WDL X       Cognitive/Neuro/Behavioral WDL    Cognitive/Neuro/Behavioral WDL WDL               Ureteral stone with hydronephrosis

## 2023-03-16 NOTE — ED ADULT NURSE NOTE - NS ED NURSE TRANSPORT WITH
[address] [phone]        ------------------------------------------------------------------------------------------------------------      FLEXIBLE SIGMOIDOSCOPY WITH SEDATION INSTRUCTIONS  PLEASE NOTE    AS OF JUNE 1, 2014, OUR OFFICE REQUIRES 48 HOURS NOTICE OF CANCELLATION/RESCHEDULE OF A PROCEDURE TO AVOID INCURRING A MISSED APPOINTMENT FEE  Your Sigmoidoscopy Procedure has been scheduled at:  [phone]  The Date of your Procedure is: 3/17/2023  Patient is to have nothing to eat or drink after midnight  Also in preparation for this procedure, take 2 Fleet Enemas 2 hours prior to the appointment  These enemas can be purchased over the counter in most pharmacies  Follow the directions on the box  You are to report to the main entrance of the hospital ONE (1) HOUR PRIOR to your procedure  Special instructions may be needed if you are taking aspirin or any aspirin-containing medication  Check with your family physician  Check with your family doctor if you are taking Coumadin (a blood thinner), Plavix, Gingko biloba, Ginseng, Feverfew, and/or St  Jose Elias's Wort  We suggest stopping these for 3 days  If you are on DIABETIC MEDICATION (tablets or insulin) your doctor may make changes in your preparation  Take all medications usual unless otherwise instructed  As always, if you have any questions or concerns, feel free to call the office  Our  staff will be happy to connect you with one of the nurses to answer any questions or address any concerns you have regarding your procedure  Arrangements must be made for a responsible party to drive you home from the procedure  If you do not have a responsible family member or friend to drive you home, the procedure will not be done  Vast or a taxi is not acceptable    It is important you notify our office of any insurance changes prior to your procedure and, if necessary, supply us with referrals from your primary care physician  cardiac monitor

## 2023-03-31 ENCOUNTER — NON-APPOINTMENT (OUTPATIENT)
Age: 73
End: 2023-03-31

## 2023-05-12 NOTE — H&P ADULT - NSHPRISKHEPCSCREEN_GEN_A_CORE
Not applicable (known HCV negative status in last year) has baseline tremor/normal/normal gait/strength 5/5 bilateral lower extremities

## 2023-06-27 ENCOUNTER — NON-APPOINTMENT (OUTPATIENT)
Age: 73
End: 2023-06-27

## 2023-08-13 NOTE — ED PROVIDER NOTE - INPATIENT RESIDENT/ACP NOTIFIED DATE
The patient is Stable - Low risk of patient condition declining or worsening    Shift Goals  Clinical Goals: Pain control, Safety  Patient Goals: Safety  Family Goals: Education    Progress made toward(s) clinical / shift goals: Pt's VSS, regular diet, able to participate in therapy.          31-Dec-2017 20:18

## 2024-04-10 NOTE — PATIENT PROFILE ADULT. - NS PRO PT REFERRAL QUES 2 YN
Prescription Refill Request Form for [facility] Tabs located in media files.   no

## 2024-05-15 NOTE — PATIENT PROFILE ADULT. - PATIENT REPRESENTATIVE: ( YOU CAN CHOOSE ANY PERSON THAT CAN ASSIST YOU WITH YOUR HEALTH CARE PREFERENCES, DOES NOT HAVE TO BE A SPOUSE, IMMEDIATE FAMILY OR SIGNIFICANT OTHER/PARTNER)
Prednisone as prescribed.  Avoid NSAIDs while on prednisone.  Cyclobenzaprine as needed for spasms, monitor for sedation.  May continue with Tylenol as needed.  Rest and ice in 15-minute intervals encouraged.  Limit picking up heavy objects and proper body mechanics discussed and encouraged.  Will place orthopedics referral for further evaluation.  Go to ED if worsens.  Risk of meds discussed and understood.  Return to clinic or ED with any issues or concerns.   Declines

## 2025-03-08 NOTE — ASSESSMENT
Chief Complaint   Patient presents with    Cough     Cough, and congestion for few weeks, state can't breath, has productive phlegm, pain to the rib from the cough,  mom had pneumonia.     CC:   Chief Complaint   Patient presents with    Cough     Cough, and congestion for few weeks, state can't breath, has productive phlegm, pain to the rib from the cough,  mom had pneumonia.        SUBJECTIVE:    Mayra Jones is a 14 year old transgender male who presents to [facility] with respiratory symptoms which have been present for several weeks. Symptoms include:, Congestion, fatigue and mild fever     denies: abdominal pain, chest pain, diarrhea, headache, nausea or vomiting.       OBJECTIVE:    Vitals:    03/08/25 0723   BP: 102/68   Pulse: 89   Resp: 20   Temp: 98.6 °F (37 °C)   TempSrc: Tympanic   SpO2: 100%   Weight: 44.5 kg (98 lb)      General appearance: Alert, well hydrated, in no apparent distress  Ear Exam: TM's intact without erythema, bulging, retraction, or fluid level - external auditory canal clear  Conjunctiva: Clear without injection or discharge; lids clear  Nose: nasal mucosa moist, pink, without rhinorrhea or sinus tenderness  Throat: erythematous and moist, but without edema or exudates  Neck: supple without cervical lymphadenopathy.  No meningismus.  Heart: regular rate and rhythm and no murmurs, clicks or gallops  Lungs: course rhonchi without wheezes or rales; normal respiratory effort  Skin: smooth without rash or edema and capillary refill is normal    ASSESSMENT/PLAN:    BRONCHITIS    See orders    Advised to return to [facility] or follow-up with primary care provider for reevaluation if symptoms worsen or are not improving in 5-7 days.    Diagnosis and prognosis, treatment and side-effects were explained and all questions were answered satisfactorily and there were no further questions upon discharge       [FreeTextEntry1] : This is a 68-year-old gentleman with a history of memory loss she will proceed with one MRI of his head to Doppler studies of his carotids 3 EEG study for neuropsychological testing related to his tremor he will have a CAT scan. I told the patient's daughter at the investigations will look for evidence of Alzheimer's or Parkinson's disease.